# Patient Record
Sex: FEMALE | Race: WHITE | NOT HISPANIC OR LATINO | Employment: FULL TIME | URBAN - METROPOLITAN AREA
[De-identification: names, ages, dates, MRNs, and addresses within clinical notes are randomized per-mention and may not be internally consistent; named-entity substitution may affect disease eponyms.]

---

## 2019-12-11 ENCOUNTER — OFFICE VISIT (OUTPATIENT)
Dept: URGENT CARE | Facility: CLINIC | Age: 29
End: 2019-12-11
Payer: COMMERCIAL

## 2019-12-11 VITALS
TEMPERATURE: 99 F | OXYGEN SATURATION: 99 % | DIASTOLIC BLOOD PRESSURE: 69 MMHG | WEIGHT: 145 LBS | SYSTOLIC BLOOD PRESSURE: 110 MMHG | HEART RATE: 83 BPM

## 2019-12-11 DIAGNOSIS — J02.9 VIRAL PHARYNGITIS: Primary | ICD-10-CM

## 2019-12-11 LAB
CTP QC/QA: YES
S PYO RRNA THROAT QL PROBE: NEGATIVE

## 2019-12-11 PROCEDURE — 99203 OFFICE O/P NEW LOW 30 MIN: CPT | Mod: 25,S$GLB,, | Performed by: NURSE PRACTITIONER

## 2019-12-11 PROCEDURE — 96372 PR INJECTION,THERAP/PROPH/DIAG2ST, IM OR SUBCUT: ICD-10-PCS | Mod: S$GLB,,, | Performed by: NURSE PRACTITIONER

## 2019-12-11 PROCEDURE — 99203 PR OFFICE/OUTPT VISIT, NEW, LEVL III, 30-44 MIN: ICD-10-PCS | Mod: 25,S$GLB,, | Performed by: NURSE PRACTITIONER

## 2019-12-11 PROCEDURE — 96372 THER/PROPH/DIAG INJ SC/IM: CPT | Mod: S$GLB,,, | Performed by: NURSE PRACTITIONER

## 2019-12-11 PROCEDURE — 87880 STREP A ASSAY W/OPTIC: CPT | Mod: QW,S$GLB,, | Performed by: NURSE PRACTITIONER

## 2019-12-11 PROCEDURE — 87880 POCT RAPID STREP A: ICD-10-PCS | Mod: QW,S$GLB,, | Performed by: NURSE PRACTITIONER

## 2019-12-11 RX ORDER — BETAMETHASONE SODIUM PHOSPHATE AND BETAMETHASONE ACETATE 3; 3 MG/ML; MG/ML
9 INJECTION, SUSPENSION INTRA-ARTICULAR; INTRALESIONAL; INTRAMUSCULAR; SOFT TISSUE
Status: COMPLETED | OUTPATIENT
Start: 2019-12-11 | End: 2019-12-11

## 2019-12-11 RX ADMIN — BETAMETHASONE SODIUM PHOSPHATE AND BETAMETHASONE ACETATE 9 MG: 3; 3 INJECTION, SUSPENSION INTRA-ARTICULAR; INTRALESIONAL; INTRAMUSCULAR; SOFT TISSUE at 12:12

## 2019-12-11 NOTE — PROGRESS NOTES
Subjective:       Patient ID: Renetta Deshpande is a 29 y.o. female.    Vitals:  weight is 65.8 kg (145 lb). Her temperature is 98.5 °F (36.9 °C). Her blood pressure is 110/69 and her pulse is 83. Her oxygen saturation is 99%.     Chief Complaint: Sore Throat    Sore Throat    This is a new problem. The current episode started in the past 7 days (3 days). The problem has been gradually worsening. Sore throat worse side: both side. The pain is at a severity of 4/10. Associated symptoms include a hoarse voice, swollen glands and trouble swallowing. Pertinent negatives include no congestion, coughing, diarrhea, headaches, shortness of breath or vomiting. Treatments tried: elderberry/Zicam. The treatment provided mild relief.       Constitution: Negative for chills, fatigue and fever.   HENT: Positive for sore throat, trouble swallowing and voice change. Negative for congestion.    Neck: Negative for painful lymph nodes.   Cardiovascular: Negative for chest pain and leg swelling.   Eyes: Negative for double vision and blurred vision.   Respiratory: Negative for cough and shortness of breath.    Gastrointestinal: Negative for nausea, vomiting and diarrhea.   Genitourinary: Negative for dysuria, frequency, urgency and history of kidney stones.   Musculoskeletal: Negative for joint pain, joint swelling, muscle cramps and muscle ache.   Skin: Negative for color change, pale, rash and bruising.   Allergic/Immunologic: Negative for seasonal allergies.   Neurological: Negative for dizziness, history of vertigo, light-headedness, passing out and headaches.   Hematologic/Lymphatic: Negative for swollen lymph nodes.   Psychiatric/Behavioral: Negative for nervous/anxious, sleep disturbance and depression. The patient is not nervous/anxious.        Objective:      Physical Exam   Constitutional: She is oriented to person, place, and time. Vital signs are normal. She appears well-developed and well-nourished. She is active and  cooperative.  Non-toxic appearance. She does not have a sickly appearance. She does not appear ill. No distress.   HENT:   Head: Normocephalic and atraumatic.   Right Ear: Hearing, tympanic membrane, abnromal external ear and ear canal normal.   Left Ear: Hearing, tympanic membrane, abnormal external ear and ear canal normal.   Nose: Nose abnormal. No mucosal edema, rhinorrhea or nasal deformity. No epistaxis. Right sinus exhibits no maxillary sinus tenderness and no frontal sinus tenderness. Left sinus exhibits no maxillary sinus tenderness and no frontal sinus tenderness.   Mouth/Throat: Uvula is midline and mucous membranes are normal. No trismus in the jaw. Normal dentition. No uvula swelling. Posterior oropharyngeal erythema present. No oropharyngeal exudate or posterior oropharyngeal edema. Tonsils are 1+ on the right. Tonsils are 1+ on the left. No tonsillar exudate.   Eyes: Pupils are equal, round, and reactive to light. Conjunctivae, EOM and lids are normal. No scleral icterus.   Neck: Trachea normal, full passive range of motion without pain and phonation normal. Neck supple. No neck rigidity. No edema and no erythema present.   Cardiovascular: Normal rate, regular rhythm, normal heart sounds, intact distal pulses and normal pulses.   Pulses:       Radial pulses are 2+ on the right side, and 2+ on the left side.   Pulmonary/Chest: Effort normal and breath sounds normal. No respiratory distress. She has no decreased breath sounds. She has no rhonchi.   Musculoskeletal: Normal range of motion. She exhibits no edema or deformity.   Neurological: She is alert and oriented to person, place, and time. She exhibits normal muscle tone. Coordination and gait normal.   Skin: Skin is warm, dry, intact, not diaphoretic and not pale. Capillary refill takes less than 2 seconds.   Psychiatric: She has a normal mood and affect. Her speech is normal and behavior is normal. Judgment and thought content normal. Cognition and  memory are normal.   Nursing note and vitals reviewed.        Assessment:       1. Viral pharyngitis        Plan:         Viral pharyngitis  -     POCT rapid strep A  -     betamethasone acetate-betamethasone sodium phosphate injection 9 mg      Patient Instructions   You received a steroid shot today - this can elevate your blood pressure, elevate your blood sugar, water weight gain, nervous energy, redness to the face and dimpling of the skin where the shot goes in.       Viral Pharyngitis (Sore Throat)    You (or your child, if your child is the patient) have pharyngitis (sore throat). This infection is caused by a virus. It can cause throat pain that is worse when swallowing, aching all over, headache, and fever. The infection may be spread by coughing, kissing, or touching others after touching your mouth or nose. Antibiotic medications do not work against viruses, so they are not used for treating this condition.  Home care  · If your symptoms are severe, rest at home. Return to work or school when you feel well enough.   · Drink plenty of fluids to avoid dehydration.  · For children: Use acetaminophen for fever, fussiness or discomfort. In infants over six months of age, you may use ibuprofen instead of acetaminophen. (NOTE: If your child has chronic liver or kidney disease or ever had a stomach ulcer or GI bleeding, talk with your doctor before using these medicines.) (NOTE: Aspirin should never be used in anyone under 18 years of age who is ill with a fever. It may cause severe liver damage.)   · For adults: You may use acetaminophen or ibuprofen to control pain or fever, unless another medicine was prescribed for this. (NOTE: If you have chronic liver or kidney disease or ever had a stomach ulcer or GI bleeding, talk with your doctor before using these medicines.)  · Throat lozenges or numbing throat sprays can help reduce pain. Gargling with warm salt water will also help reduce throat pain. For this,  dissolve 1/2 teaspoon of salt in 1 glass of warm water. To help soothe a sore throat, children can sip on juice or a popsicle. Children 5 years and older can also suck on a lollipop or hard candy.  · Avoid salty or spicy foods, which can be irritating to the throat.  Follow-up care  Follow up with your healthcare provider or our staff if you are not improving over the next week.  When to seek medical advice  Call your healthcare provider right away if any of these occur:  · Fever as directed by your doctor.  For children, seek care if:  ¨ Your child is of any age and has repeated fevers above 104°F (40°C).  ¨ Your child is younger than 2 years of age and has a fever of 100.4°F (38°C) that continues for more than 1 day.  ¨ Your child is 2 years old or older and has a fever of 100.4°F (38°C) that continues for more than 3 days.  · New or worsening ear pain, sinus pain, or headache  · Painful lumps in the back of neck  · Stiff neck  · Lymph nodes are getting larger  · Inability to swallow liquids, excessive drooling, or inability to open mouth wide due to throat pain  · Signs of dehydration (very dark urine or no urine, sunken eyes, dizziness)  · Trouble breathing or noisy breathing  · Muffled voice  · New rash  · Child appears to be getting sicker  Date Last Reviewed: 4/13/2015  © 1464-9180 [employer]. [address] [place] [address], [place] [address]. All rights reserved. This information is not intended as a substitute for professional medical care. Always follow your healthcare professional's instructions.

## 2019-12-11 NOTE — PATIENT INSTRUCTIONS
You received a steroid shot today - this can elevate your blood pressure, elevate your blood sugar, water weight gain, nervous energy, redness to the face and dimpling of the skin where the shot goes in.       Viral Pharyngitis (Sore Throat)    You (or your child, if your child is the patient) have pharyngitis (sore throat). This infection is caused by a virus. It can cause throat pain that is worse when swallowing, aching all over, headache, and fever. The infection may be spread by coughing, kissing, or touching others after touching your mouth or nose. Antibiotic medications do not work against viruses, so they are not used for treating this condition.  Home care  · If your symptoms are severe, rest at home. Return to work or school when you feel well enough.   · Drink plenty of fluids to avoid dehydration.  · For children: Use acetaminophen for fever, fussiness or discomfort. In infants over six months of age, you may use ibuprofen instead of acetaminophen. (NOTE: If your child has chronic liver or kidney disease or ever had a stomach ulcer or GI bleeding, talk with your doctor before using these medicines.) (NOTE: Aspirin should never be used in anyone under 18 years of age who is ill with a fever. It may cause severe liver damage.)   · For adults: You may use acetaminophen or ibuprofen to control pain or fever, unless another medicine was prescribed for this. (NOTE: If you have chronic liver or kidney disease or ever had a stomach ulcer or GI bleeding, talk with your doctor before using these medicines.)  · Throat lozenges or numbing throat sprays can help reduce pain. Gargling with warm salt water will also help reduce throat pain. For this, dissolve 1/2 teaspoon of salt in 1 glass of warm water. To help soothe a sore throat, children can sip on juice or a popsicle. Children 5 years and older can also suck on a lollipop or hard candy.  · Avoid salty or spicy foods, which can be irritating to the  throat.  Follow-up care  Follow up with your healthcare provider or our staff if you are not improving over the next week.  When to seek medical advice  Call your healthcare provider right away if any of these occur:  · Fever as directed by your doctor.  For children, seek care if:  ¨ Your child is of any age and has repeated fevers above 104°F (40°C).  ¨ Your child is younger than 2 years of age and has a fever of 100.4°F (38°C) that continues for more than 1 day.  ¨ Your child is 2 years old or older and has a fever of 100.4°F (38°C) that continues for more than 3 days.  · New or worsening ear pain, sinus pain, or headache  · Painful lumps in the back of neck  · Stiff neck  · Lymph nodes are getting larger  · Inability to swallow liquids, excessive drooling, or inability to open mouth wide due to throat pain  · Signs of dehydration (very dark urine or no urine, sunken eyes, dizziness)  · Trouble breathing or noisy breathing  · Muffled voice  · New rash  · Child appears to be getting sicker  Date Last Reviewed: 4/13/2015  © 2776-8080 [employer]. [address] [place] [address], [place] [address]. All rights reserved. This information is not intended as a substitute for professional medical care. Always follow your healthcare professional's instructions.

## 2022-04-05 ENCOUNTER — OFFICE VISIT (OUTPATIENT)
Dept: INTERNAL MEDICINE | Facility: CLINIC | Age: 32
End: 2022-04-05
Payer: COMMERCIAL

## 2022-04-05 VITALS
OXYGEN SATURATION: 99 % | SYSTOLIC BLOOD PRESSURE: 126 MMHG | BODY MASS INDEX: 26.45 KG/M2 | DIASTOLIC BLOOD PRESSURE: 62 MMHG | HEIGHT: 65 IN | TEMPERATURE: 98 F | WEIGHT: 158.75 LBS | HEART RATE: 77 BPM

## 2022-04-05 DIAGNOSIS — J45.990 EXERCISE-INDUCED ASTHMA: ICD-10-CM

## 2022-04-05 DIAGNOSIS — R06.09 DYSPNEA ON EXERTION: ICD-10-CM

## 2022-04-05 DIAGNOSIS — Z00.00 WELLNESS EXAMINATION: Primary | ICD-10-CM

## 2022-04-05 DIAGNOSIS — R00.0 TACHYCARDIA: ICD-10-CM

## 2022-04-05 DIAGNOSIS — Z11.59 ENCOUNTER FOR HEPATITIS C SCREENING TEST FOR LOW RISK PATIENT: ICD-10-CM

## 2022-04-05 DIAGNOSIS — R00.2 PALPITATIONS: ICD-10-CM

## 2022-04-05 PROCEDURE — 99385 PR PREVENTIVE VISIT,NEW,18-39: ICD-10-PCS | Mod: S$GLB,,, | Performed by: INTERNAL MEDICINE

## 2022-04-05 PROCEDURE — 3078F PR MOST RECENT DIASTOLIC BLOOD PRESSURE < 80 MM HG: ICD-10-PCS | Mod: CPTII,S$GLB,, | Performed by: INTERNAL MEDICINE

## 2022-04-05 PROCEDURE — 3078F DIAST BP <80 MM HG: CPT | Mod: CPTII,S$GLB,, | Performed by: INTERNAL MEDICINE

## 2022-04-05 PROCEDURE — 3074F PR MOST RECENT SYSTOLIC BLOOD PRESSURE < 130 MM HG: ICD-10-PCS | Mod: CPTII,S$GLB,, | Performed by: INTERNAL MEDICINE

## 2022-04-05 PROCEDURE — 99999 PR PBB SHADOW E&M-EST. PATIENT-LVL III: ICD-10-PCS | Mod: PBBFAC,,, | Performed by: INTERNAL MEDICINE

## 2022-04-05 PROCEDURE — 3008F PR BODY MASS INDEX (BMI) DOCUMENTED: ICD-10-PCS | Mod: CPTII,S$GLB,, | Performed by: INTERNAL MEDICINE

## 2022-04-05 PROCEDURE — 99999 PR PBB SHADOW E&M-EST. PATIENT-LVL III: CPT | Mod: PBBFAC,,, | Performed by: INTERNAL MEDICINE

## 2022-04-05 PROCEDURE — 99385 PREV VISIT NEW AGE 18-39: CPT | Mod: S$GLB,,, | Performed by: INTERNAL MEDICINE

## 2022-04-05 PROCEDURE — 3008F BODY MASS INDEX DOCD: CPT | Mod: CPTII,S$GLB,, | Performed by: INTERNAL MEDICINE

## 2022-04-05 PROCEDURE — 3074F SYST BP LT 130 MM HG: CPT | Mod: CPTII,S$GLB,, | Performed by: INTERNAL MEDICINE

## 2022-04-05 RX ORDER — NORETHINDRONE ACETATE AND ETHINYL ESTRADIOL 1MG-20(21)
1 KIT ORAL DAILY
COMMUNITY
Start: 2022-03-16

## 2022-04-05 NOTE — PROGRESS NOTES
[facility] Note    Chief Complaint      Chief Complaint   Patient presents with    Establish Care     Discuss heart rate during work outs    Annual Exam     History of Present Illness      Renetta Deshpande is a 31 y.o. female who presents today for chief complaint establish care and . Patient is new to me.    PCP: Samantha Dow MD  Patient comes to appointment alone.     HPI   Used to see Dr Hyatt, last seen 2.2019  Has concerns of elevated heart rate when shes exercising, says can get up to 190, has a fam hx of heart disease she is not sure who has heart disease or what the diagnosis is. 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  Mom and dad alive, garland snot know dad's medical history    Works as MA in [employer]. Mood is ok, having some new onset sleep difficulty, exercise as above, watches diet       Active Problem List with Overview Notes    Diagnosis Date Noted    Dyspnea on exertion 04/05/2022    Palpitations 04/05/2022    Tachycardia 04/05/2022       Health Maintenance   Topic Date Due    Hepatitis C Screening  Never done    Lipid Panel  Never done    TETANUS VACCINE  05/01/2024       History reviewed. No pertinent past medical history.    Past Surgical History:   Procedure Laterality Date    TONSILLECTOMY         family history includes No Known Problems in her father and mother.    Social History     Tobacco Use    Smoking status: Never Smoker    Smokeless  "tobacco: Never Used   Substance Use Topics    Alcohol use: Not Currently    Drug use: Never       Review of Systems   Constitutional: Negative for chills, fever, malaise/fatigue and weight loss.   Respiratory: Positive for shortness of breath and wheezing. Negative for cough and sputum production.    Cardiovascular: Positive for palpitations. Negative for chest pain, orthopnea, claudication, leg swelling and PND.   Gastrointestinal: Negative for constipation, diarrhea, nausea and vomiting.   Genitourinary: Negative for dysuria, frequency, hematuria and urgency.        Outpatient Encounter Medications as of 4/5/2022   Medication Sig Dispense Refill    BLISOVI FE 1/20, 28, 1 mg-20 mcg (21)/75 mg (7) per tablet Take 1 tablet by mouth once daily.       No facility-administered encounter medications on file as of 4/5/2022.        Review of patient's allergies indicates:  No Known Allergies        Physical Exam      Vital Signs  Temp: 98.3 °F (36.8 °C)  Temp src: Oral  Pulse: 77  SpO2: 99 %  BP: 126/62  BP Location: Right arm  Patient Position: Sitting  Pain Score: 0-No pain  Height and Weight  Height: 5' 5" (165.1 cm)  Weight: 72 kg (158 lb 11.7 oz)  BSA (Calculated - sq m): 1.82 sq meters  BMI (Calculated): 26.4  Weight in (lb) to have BMI = 25: 149.9]    Physical Exam  Vitals reviewed.   Constitutional:       General: She is not in acute distress.     Appearance: She is well-developed. She is not diaphoretic.   HENT:      Head: Normocephalic and atraumatic.      Right Ear: External ear normal.      Left Ear: External ear normal.      Nose: Nose normal.   Eyes:      General:         Right eye: No discharge.         Left eye: No discharge.      Conjunctiva/sclera: Conjunctivae normal.   Neck:      Thyroid: No thyromegaly.   Cardiovascular:      Rate and Rhythm: Normal rate and regular rhythm.      Heart sounds: Normal heart sounds. No murmur heard.  Pulmonary:      Effort: Pulmonary effort is normal. No respiratory " distress.      Breath sounds: Normal breath sounds.   Abdominal:      General: Bowel sounds are normal. There is no distension.      Palpations: Abdomen is soft.      Tenderness: There is no abdominal tenderness.   Musculoskeletal:         General: No deformity. Normal range of motion.      Cervical back: Normal range of motion.   Skin:     General: Skin is warm and dry.      Coloration: Skin is not pale.      Findings: No rash.   Neurological:      Mental Status: She is alert and oriented to person, place, and time.   Psychiatric:         Behavior: Behavior normal.         Thought Content: Thought content normal.         Judgment: Judgment normal.            Laboratory & Radiology:  No recent for review       Assessment/Plan     Renetta Deshpande is a 31 y.o.female with:    Tachycardia  Holter    Palpitations  Holter, cmp mg, tsh     Dyspnea on exertion  Exercise stress, ref to cardiology  Discussed age related target and max heart rate     Get Gyn records   Annual labs     Orders Placed This Encounter   Procedures    Lipid Panel     Standing Status:   Future     Standing Expiration Date:   6/4/2023    Comprehensive Metabolic Panel     Standing Status:   Future     Standing Expiration Date:   6/4/2023    T4, Free     Standing Status:   Future     Standing Expiration Date:   6/5/2023    TSH     Standing Status:   Future     Standing Expiration Date:   6/5/2023    Magnesium     Standing Status:   Future     Standing Expiration Date:   6/4/2023    CBC Without Differential     Standing Status:   Future     Standing Expiration Date:   6/4/2023    Hepatitis C Antibody     Standing Status:   Future     Standing Expiration Date:   6/4/2023     Order Specific Question:   Release to patient     Answer:   Immediate    Holter monitor - 48 hour     Standing Status:   Future     Standing Expiration Date:   4/5/2023     Order Specific Question:   Release to patient     Answer:   Immediate    Exercise Stress - EKG      Standing Status:   Future     Standing Expiration Date:   4/5/2023     Order Specific Question:   Release to patient     Answer:   Immediate       Use of the [employer] Patient Portal discussed and encouraged during today's visit  -Continue current medications and maintain follow up with specialists.  Return to clinic in 2 months.  No future appointments.    Samantha Dow MD  4/5/2022 8:11 AM    Primary Care Internal Medicine

## 2022-04-05 NOTE — LETTER
AUTHORIZATION FOR RELEASE OF   CONFIDENTIAL INFORMATION    Dear Dr. Ramírez,    We are seeing Renetta Deshpande, date of birth 1990, in the clinic at [facility]. Samantha Dow MD is the patient's PCP. Renetta Deshpande has an outstanding lab/procedure at the time we reviewed her chart. In order to help keep her health information updated, she has authorized us to request the following medical record(s):        (  )  MAMMOGRAM                                      (  )  COLONOSCOPY      ( X )  PAP SMEAR                                          (  )  OUTSIDE LAB RESULTS     (  )  DEXA SCAN                                          (  )  EYE EXAM            (  )  FOOT EXAM                                          (  )  ENTIRE RECORD     (  )  OUTSIDE IMMUNIZATIONS                 (  )  _______________         Please fax records to [facility], Abby E. Gandolfi, MD, [phone]     If you have any questions, please contact Renetta at [phone].           Patient Name: Renetta Deshpande  : 1990  Patient Phone #: [phone]

## 2022-04-08 ENCOUNTER — LAB VISIT (OUTPATIENT)
Dept: LAB | Facility: HOSPITAL | Age: 32
End: 2022-04-08
Attending: INTERNAL MEDICINE
Payer: COMMERCIAL

## 2022-04-08 DIAGNOSIS — R00.2 PALPITATIONS: ICD-10-CM

## 2022-04-08 DIAGNOSIS — Z11.59 ENCOUNTER FOR HEPATITIS C SCREENING TEST FOR LOW RISK PATIENT: ICD-10-CM

## 2022-04-08 DIAGNOSIS — Z00.00 WELLNESS EXAMINATION: ICD-10-CM

## 2022-04-08 LAB
ALBUMIN SERPL BCP-MCNC: 3.8 G/DL (ref 3.5–5.2)
ALP SERPL-CCNC: 43 U/L (ref 55–135)
ALT SERPL W/O P-5'-P-CCNC: 13 U/L (ref 10–44)
ANION GAP SERPL CALC-SCNC: 10 MMOL/L (ref 8–16)
AST SERPL-CCNC: 17 U/L (ref 10–40)
BILIRUB SERPL-MCNC: 0.5 MG/DL (ref 0.1–1)
BUN SERPL-MCNC: 17 MG/DL (ref 6–20)
CALCIUM SERPL-MCNC: 9.2 MG/DL (ref 8.7–10.5)
CHLORIDE SERPL-SCNC: 105 MMOL/L (ref 95–110)
CHOLEST SERPL-MCNC: 156 MG/DL (ref 120–199)
CHOLEST/HDLC SERPL: 2.3 {RATIO} (ref 2–5)
CO2 SERPL-SCNC: 25 MMOL/L (ref 23–29)
CREAT SERPL-MCNC: 0.8 MG/DL (ref 0.5–1.4)
ERYTHROCYTE [DISTWIDTH] IN BLOOD BY AUTOMATED COUNT: 12.2 % (ref 11.5–14.5)
EST. GFR  (AFRICAN AMERICAN): >60 ML/MIN/1.73 M^2
EST. GFR  (NON AFRICAN AMERICAN): >60 ML/MIN/1.73 M^2
GLUCOSE SERPL-MCNC: 88 MG/DL (ref 70–110)
HCT VFR BLD AUTO: 41.6 % (ref 37–48.5)
HDLC SERPL-MCNC: 68 MG/DL (ref 40–75)
HDLC SERPL: 43.6 % (ref 20–50)
HGB BLD-MCNC: 13.3 G/DL (ref 12–16)
LDLC SERPL CALC-MCNC: 76.4 MG/DL (ref 63–159)
MAGNESIUM SERPL-MCNC: 2 MG/DL (ref 1.6–2.6)
MCH RBC QN AUTO: 31.4 PG (ref 27–31)
MCHC RBC AUTO-ENTMCNC: 32 G/DL (ref 32–36)
MCV RBC AUTO: 98 FL (ref 82–98)
NONHDLC SERPL-MCNC: 88 MG/DL
PLATELET # BLD AUTO: 209 K/UL (ref 150–450)
PMV BLD AUTO: 11.4 FL (ref 9.2–12.9)
POTASSIUM SERPL-SCNC: 4 MMOL/L (ref 3.5–5.1)
PROT SERPL-MCNC: 7.1 G/DL (ref 6–8.4)
RBC # BLD AUTO: 4.23 M/UL (ref 4–5.4)
SODIUM SERPL-SCNC: 140 MMOL/L (ref 136–145)
T4 FREE SERPL-MCNC: 0.89 NG/DL (ref 0.71–1.51)
TRIGL SERPL-MCNC: 58 MG/DL (ref 30–150)
TSH SERPL DL<=0.005 MIU/L-ACNC: 1.5 UIU/ML (ref 0.4–4)
WBC # BLD AUTO: 6.63 K/UL (ref 3.9–12.7)

## 2022-04-08 PROCEDURE — 84439 ASSAY OF FREE THYROXINE: CPT | Performed by: INTERNAL MEDICINE

## 2022-04-08 PROCEDURE — 80053 COMPREHEN METABOLIC PANEL: CPT | Performed by: INTERNAL MEDICINE

## 2022-04-08 PROCEDURE — 36415 COLL VENOUS BLD VENIPUNCTURE: CPT | Performed by: INTERNAL MEDICINE

## 2022-04-08 PROCEDURE — 84443 ASSAY THYROID STIM HORMONE: CPT | Performed by: INTERNAL MEDICINE

## 2022-04-08 PROCEDURE — 83735 ASSAY OF MAGNESIUM: CPT | Performed by: INTERNAL MEDICINE

## 2022-04-08 PROCEDURE — 86803 HEPATITIS C AB TEST: CPT | Performed by: INTERNAL MEDICINE

## 2022-04-08 PROCEDURE — 85027 COMPLETE CBC AUTOMATED: CPT | Performed by: INTERNAL MEDICINE

## 2022-04-08 PROCEDURE — 80061 LIPID PANEL: CPT | Performed by: INTERNAL MEDICINE

## 2022-04-11 ENCOUNTER — PATIENT MESSAGE (OUTPATIENT)
Dept: INTERNAL MEDICINE | Facility: CLINIC | Age: 32
End: 2022-04-11
Payer: COMMERCIAL

## 2022-04-12 LAB — HCV AB SERPL QL IA: NEGATIVE

## 2022-04-18 ENCOUNTER — CLINICAL SUPPORT (OUTPATIENT)
Dept: CARDIOLOGY | Facility: HOSPITAL | Age: 32
End: 2022-04-18
Attending: INTERNAL MEDICINE
Payer: COMMERCIAL

## 2022-04-18 ENCOUNTER — HOSPITAL ENCOUNTER (OUTPATIENT)
Dept: CARDIOLOGY | Facility: HOSPITAL | Age: 32
Discharge: HOME | End: 2022-04-18
Attending: INTERNAL MEDICINE
Payer: COMMERCIAL

## 2022-04-18 VITALS — WEIGHT: 158 LBS | BODY MASS INDEX: 26.33 KG/M2 | HEIGHT: 65 IN

## 2022-04-18 DIAGNOSIS — R00.2 PALPITATIONS: ICD-10-CM

## 2022-04-18 DIAGNOSIS — R06.09 DYSPNEA ON EXERTION: ICD-10-CM

## 2022-04-18 DIAGNOSIS — R00.0 TACHYCARDIA: ICD-10-CM

## 2022-04-18 LAB
CV STRESS BASE HR: 57 BPM
DIASTOLIC BLOOD PRESSURE: 55 MMHG
OHS CV CPX 1 MINUTE RECOVERY HEART RATE: 139 BPM
OHS CV CPX 85 PERCENT MAX PREDICTED HEART RATE MALE: 150
OHS CV CPX ESTIMATED METS: 15
OHS CV CPX MAX PREDICTED HEART RATE: 177
OHS CV CPX PATIENT IS FEMALE: 1
OHS CV CPX PATIENT IS MALE: 0
OHS CV CPX PEAK DIASTOLIC BLOOD PRESSURE: 78 MMHG
OHS CV CPX PEAK HEAR RATE: 176 BPM
OHS CV CPX PEAK RATE PRESSURE PRODUCT: NORMAL
OHS CV CPX PEAK SYSTOLIC BLOOD PRESSURE: 198 MMHG
OHS CV CPX PERCENT MAX PREDICTED HEART RATE ACHIEVED: 99
OHS CV CPX RATE PRESSURE PRODUCT PRESENTING: 6099
STRESS ECHO POST EXERCISE DUR MIN: 9 MINUTES
STRESS ECHO POST EXERCISE DUR SEC: 1 SECONDS
SYSTOLIC BLOOD PRESSURE: 107 MMHG

## 2022-04-18 PROCEDURE — 93018 CV STRESS TEST I&R ONLY: CPT | Mod: ,,, | Performed by: INTERNAL MEDICINE

## 2022-04-18 PROCEDURE — 93225 XTRNL ECG REC<48 HRS REC: CPT

## 2022-04-18 PROCEDURE — 93017 CV STRESS TEST TRACING ONLY: CPT

## 2022-04-18 PROCEDURE — 93016 CV STRESS TEST SUPVJ ONLY: CPT | Mod: ,,, | Performed by: INTERNAL MEDICINE

## 2022-04-18 PROCEDURE — 93227 XTRNL ECG REC<48 HR R&I: CPT | Mod: ,,, | Performed by: INTERNAL MEDICINE

## 2022-04-18 PROCEDURE — 93016 EXERCISE STRESS - EKG (CUPID ONLY): ICD-10-PCS | Mod: ,,, | Performed by: INTERNAL MEDICINE

## 2022-04-18 PROCEDURE — 93227 HOLTER MONITOR - 48 HOUR (CUPID ONLY): ICD-10-PCS | Mod: ,,, | Performed by: INTERNAL MEDICINE

## 2022-04-18 PROCEDURE — 93018 EXERCISE STRESS - EKG (CUPID ONLY): ICD-10-PCS | Mod: ,,, | Performed by: INTERNAL MEDICINE

## 2022-04-22 LAB
OHS CV EVENT MONITOR DAY: 0
OHS CV HOLTER LENGTH DECIMAL HOURS: 48
OHS CV HOLTER LENGTH HOURS: 48
OHS CV HOLTER LENGTH MINUTES: 0
OHS CV HOLTER SINUS AVERAGE HR: 74
OHS CV HOLTER SINUS MAX HR: 185
OHS CV HOLTER SINUS MIN HR: 45

## 2022-05-30 ENCOUNTER — PATIENT MESSAGE (OUTPATIENT)
Dept: ADMINISTRATIVE | Facility: HOSPITAL | Age: 32
End: 2022-05-30
Payer: COMMERCIAL

## 2022-06-07 ENCOUNTER — PATIENT MESSAGE (OUTPATIENT)
Dept: INTERNAL MEDICINE | Facility: CLINIC | Age: 32
End: 2022-06-07
Payer: COMMERCIAL

## 2022-06-08 ENCOUNTER — PATIENT MESSAGE (OUTPATIENT)
Dept: INTERNAL MEDICINE | Facility: CLINIC | Age: 32
End: 2022-06-08
Payer: COMMERCIAL

## 2022-06-17 DIAGNOSIS — R05.9 COUGH: Primary | ICD-10-CM

## 2022-06-17 LAB
CTP QC/QA: YES
SARS-COV-2 AG RESP QL IA.RAPID: POSITIVE

## 2022-06-20 ENCOUNTER — PATIENT MESSAGE (OUTPATIENT)
Dept: INFECTIOUS DISEASES | Facility: HOSPITAL | Age: 32
End: 2022-06-20
Payer: COMMERCIAL

## 2022-06-28 ENCOUNTER — OFFICE VISIT (OUTPATIENT)
Dept: INTERNAL MEDICINE | Facility: CLINIC | Age: 32
End: 2022-06-28
Payer: COMMERCIAL

## 2022-06-28 VITALS
OXYGEN SATURATION: 99 % | HEIGHT: 65 IN | SYSTOLIC BLOOD PRESSURE: 114 MMHG | TEMPERATURE: 99 F | HEART RATE: 81 BPM | DIASTOLIC BLOOD PRESSURE: 64 MMHG | WEIGHT: 162.94 LBS | BODY MASS INDEX: 27.15 KG/M2

## 2022-06-28 DIAGNOSIS — R55 SYNCOPE, UNSPECIFIED SYNCOPE TYPE: Primary | ICD-10-CM

## 2022-06-28 PROCEDURE — 1159F MED LIST DOCD IN RCRD: CPT | Mod: CPTII,S$GLB,, | Performed by: INTERNAL MEDICINE

## 2022-06-28 PROCEDURE — 3008F BODY MASS INDEX DOCD: CPT | Mod: CPTII,S$GLB,, | Performed by: INTERNAL MEDICINE

## 2022-06-28 PROCEDURE — 3074F SYST BP LT 130 MM HG: CPT | Mod: CPTII,S$GLB,, | Performed by: INTERNAL MEDICINE

## 2022-06-28 PROCEDURE — 3074F PR MOST RECENT SYSTOLIC BLOOD PRESSURE < 130 MM HG: ICD-10-PCS | Mod: CPTII,S$GLB,, | Performed by: INTERNAL MEDICINE

## 2022-06-28 PROCEDURE — 3078F PR MOST RECENT DIASTOLIC BLOOD PRESSURE < 80 MM HG: ICD-10-PCS | Mod: CPTII,S$GLB,, | Performed by: INTERNAL MEDICINE

## 2022-06-28 PROCEDURE — 99999 PR PBB SHADOW E&M-EST. PATIENT-LVL III: ICD-10-PCS | Mod: PBBFAC,,, | Performed by: INTERNAL MEDICINE

## 2022-06-28 PROCEDURE — 3078F DIAST BP <80 MM HG: CPT | Mod: CPTII,S$GLB,, | Performed by: INTERNAL MEDICINE

## 2022-06-28 PROCEDURE — 99214 PR OFFICE/OUTPT VISIT, EST, LEVL IV, 30-39 MIN: ICD-10-PCS | Mod: S$GLB,,, | Performed by: INTERNAL MEDICINE

## 2022-06-28 PROCEDURE — 99999 PR PBB SHADOW E&M-EST. PATIENT-LVL III: CPT | Mod: PBBFAC,,, | Performed by: INTERNAL MEDICINE

## 2022-06-28 PROCEDURE — 3008F PR BODY MASS INDEX (BMI) DOCUMENTED: ICD-10-PCS | Mod: CPTII,S$GLB,, | Performed by: INTERNAL MEDICINE

## 2022-06-28 PROCEDURE — 99214 OFFICE O/P EST MOD 30 MIN: CPT | Mod: S$GLB,,, | Performed by: INTERNAL MEDICINE

## 2022-06-28 PROCEDURE — 1159F PR MEDICATION LIST DOCUMENTED IN MEDICAL RECORD: ICD-10-PCS | Mod: CPTII,S$GLB,, | Performed by: INTERNAL MEDICINE

## 2022-06-28 NOTE — PROGRESS NOTES
[facility] Note    Chief Complaint      Chief Complaint   Patient presents with    Follow-up     No real change or improvement, still pending for stress test      History of Present Illness      Renetta Deshpande is a 31 y.o. female who presents today for chief complaint follow up tachycardia .     HPI   Seen in Aprl for annual   Had negative holter and stress test, still feeling like she has tachycardia she feel is unexplained when she exercises, having to work harder when she exercises   Hr max 198, no clear precipitating factor as to what drives her HR up, she juist feels her HR is high both at rest and with exertion for her level of activity   Feels like she takes an unusually amount of time to recover, no change in her endurance or personal recovery   Having presyncope, gets light headed, tunnel vision, she is able to overcome this, always on rest never at exertion,has had syncope in the past   Active Problem List with Overview Notes    Diagnosis Date Noted    Dyspnea on exertion 04/05/2022    Palpitations 04/05/2022    Tachycardia 04/05/2022       Health Maintenance   Topic Date Due    TETANUS VACCINE  05/01/2024    Hepatitis C Screening  Completed    Lipid Panel  Completed       History reviewed. No pertinent past medical history.    Past Surgical History:   Procedure Laterality Date    TONSILLECTOMY         family history includes No Known Problems in her father and mother.    Social History     Tobacco Use    Smoking status: Never Smoker    Smokeless tobacco: Never Used   Substance Use Topics    Alcohol use: Not Currently    Drug use: Never       Review of Systems   Constitutional: Negative for chills, fever, malaise/fatigue and weight loss.   Respiratory: Negative for cough, sputum production, shortness of breath and wheezing.    Cardiovascular: Positive for palpitations. Negative for chest pain, orthopnea and leg swelling.   Gastrointestinal: Negative for  "constipation, diarrhea, nausea and vomiting.   Genitourinary: Negative for dysuria, frequency, hematuria and urgency.        Outpatient Encounter Medications as of 6/28/2022   Medication Sig Dispense Refill    BLISOVI FE 1/20, 28, 1 mg-20 mcg (21)/75 mg (7) per tablet Take 1 tablet by mouth once daily.       No facility-administered encounter medications on file as of 6/28/2022.        Review of patient's allergies indicates:  No Known Allergies        Physical Exam      Vital Signs  Temp: 98.5 °F (36.9 °C)  Temp src: Oral  Pulse: 81  SpO2: 99 %  BP: 114/64  BP Location: Left arm  Patient Position: Sitting  Pain Score: 0-No pain  Height and Weight  Height: 5' 5" (165.1 cm)  Weight: 73.9 kg (162 lb 14.7 oz)  BSA (Calculated - sq m): 1.84 sq meters  BMI (Calculated): 27.1  Weight in (lb) to have BMI = 25: 149.9]    Physical Exam  Vitals reviewed.   Constitutional:       General: She is not in acute distress.     Appearance: She is well-developed. She is not diaphoretic.   HENT:      Head: Normocephalic and atraumatic.      Right Ear: External ear normal.      Left Ear: External ear normal.      Nose: Nose normal.   Eyes:      General:         Right eye: No discharge.         Left eye: No discharge.      Conjunctiva/sclera: Conjunctivae normal.   Cardiovascular:      Rate and Rhythm: Normal rate and regular rhythm.      Heart sounds: Normal heart sounds.   Pulmonary:      Effort: Pulmonary effort is normal. No respiratory distress.   Musculoskeletal:         General: Normal range of motion.      Cervical back: Normal range of motion.   Skin:     Coloration: Skin is not pale.      Findings: No rash.   Neurological:      Mental Status: She is alert and oriented to person, place, and time.   Psychiatric:         Behavior: Behavior normal.         Thought Content: Thought content normal.         Judgment: Judgment normal.          Laboratory:  CBC:  Recent Labs   Lab Result Units 04/08/22  0750   WBC K/uL 6.63   RBC M/uL " 4.23   Hemoglobin g/dL 13.3   Hematocrit % 41.6   Platelets K/uL 209   MCV fL 98   MCH pg 31.4*   MCHC g/dL 32.0     CMP:  Recent Labs   Lab Result Units 04/08/22  0750   Glucose mg/dL 88   Calcium mg/dL 9.2   Albumin g/dL 3.8   Total Protein g/dL 7.1   Sodium mmol/L 140   Potassium mmol/L 4.0   CO2 mmol/L 25   Chloride mmol/L 105   BUN mg/dL 17   Alkaline Phosphatase U/L 43*   ALT U/L 13   AST U/L 17   Total Bilirubin mg/dL 0.5     URINALYSIS:  No results for input(s): COLORU, CLARITYU, SPECGRAV, PHUR, PROTEINUA, GLUCOSEU, BILIRUBINCON, BLOODU, WBCU, RBCU, BACTERIA, MUCUS, NITRITE, LEUKOCYTESUR, UROBILINOGEN, HYALINECASTS in the last 2160 hours.   LIPIDS:  Recent Labs   Lab Result Units 04/08/22  0750   TSH uIU/mL 1.504   HDL mg/dL 68   Cholesterol mg/dL 156   Triglycerides mg/dL 58   LDL Cholesterol mg/dL 76.4   HDL/Cholesterol Ratio % 43.6   Non-HDL Cholesterol mg/dL 88   Total Cholesterol/HDL Ratio  2.3     TSH:  Recent Labs   Lab Result Units 04/08/22  0750   TSH uIU/mL 1.504           Assessment/Plan     Renetta Deshpande is a 31 y.o.female with:    1. Syncope, unspecified syncope type  -     Echo  -     Ambulatory referral/consult to Cardiology  -    Lengthy discussion re phsyciology and arrhythmia, ultimately agree that cardiology evaluation is warranted to make definitive plan          Use of the [employer] Patient Portal discussed and encouraged during today's visit  -Continue current medications and maintain follow up with specialists.  Return to clinic in prn .  No future appointments.    Samantha Dow MD  6/28/2022 1:55 PM    Primary Care Internal Medicine

## 2022-06-30 NOTE — PROGRESS NOTES
Cardiology Clinic Note  Reason for Visit: syncope    HPI:     Renetta Deshpande is a 31 y.o. F, who presents for syncope.    She presented to her PCP in 4/2022. Reported elevated heart rate when exercising.   She actively trains. She participates in triathlons. Resting HR 50s.  Has occasional dyspnea/wheezing for recovery period. ?h/o exercise indceud asthma. No PFTs.  Has occasional palps when exercising and HR is 180bpm.   A holter and treadmill were performed and normal. Holter showed sinus tach during palps with HR 150s.    On visit to PCP in 6/2022, she reported ongoing tachycardia when exercising. Feels like she is having to work harder when exercising.  Max HR 198bpm. Feels elevated HR at rest and with exertion. Feels like it takes longer time to recover. No change in endurance.  She also reported presyncope, lightheadedness, tunnel vision. Always at rest, never with exertion. No palpitations/chest pain prior to symptoms.    Awaiting echo.    Medical: none  Surgical: Reviewed, as below.  Family: Reviewed, as below. No premature CAD, HF, SCD.  Social: Reviewed, as below.    ROS:    Pertinent ROS included in HPI and below.  PMH:   No past medical history on file.  Past Surgical History:   Procedure Laterality Date    TONSILLECTOMY       Allergies:   Review of patient's allergies indicates:  No Known Allergies  Medications:     Current Outpatient Medications on File Prior to Visit   Medication Sig Dispense Refill    BLISOVI FE 1/20, 28, 1 mg-20 mcg (21)/75 mg (7) per tablet Take 1 tablet by mouth once daily.       No current facility-administered medications on file prior to visit.     Social History:     Social History     Tobacco Use    Smoking status: Never Smoker    Smokeless tobacco: Never Used   Substance Use Topics    Alcohol use: Not Currently     Family History:     Family History   Problem Relation Age of Onset    No Known Problems Mother     No Known Problems Father      Physical  "Exam:   /70   Ht 5' 5" (1.651 m)   Wt 73 kg (160 lb 15 oz)   BMI 26.78 kg/m²      Constitutional: No apparent distress, conversant  HEENT: Sclera anicteric, extraocular movements intact  Neck: No jugular venous distension, no carotid bruits  CV: Regular rate and rhythm, 2/6 systolic murmur at RUSB, normal S1/S2  Pulm: Clear to auscultation bilaterally  GI: Abdomen soft, no palpable masses  Extremities: No lower extremity edema, warm with palpable pulses  Skin: No ecchymosis, erythema, or ulcers  Psych: Alert and oriented to person place location, appropriate affect  Neuro: No focal deficits    Labs:     Blood Tests:  Lab Results   Component Value Date     04/08/2022    K 4.0 04/08/2022     04/08/2022    CO2 25 04/08/2022    BUN 17 04/08/2022    CREATININE 0.8 04/08/2022    GLU 88 04/08/2022    MG 2.0 04/08/2022    AST 17 04/08/2022    ALT 13 04/08/2022    ALBUMIN 3.8 04/08/2022    PROT 7.1 04/08/2022    BILITOT 0.5 04/08/2022    WBC 6.63 04/08/2022    HGB 13.3 04/08/2022    HCT 41.6 04/08/2022    MCV 98 04/08/2022     04/08/2022    TSH 1.504 04/08/2022       Lab Results   Component Value Date    CHOL 156 04/08/2022    HDL 68 04/08/2022    TRIG 58 04/08/2022       Lab Results   Component Value Date    LDLCALC 76.4 04/08/2022       Urine Tests:  No results found for: COLORU, APPEARANCEUA, PHUR, SPECGRAV, PROTEINUA, GLUCUA, KETONESU, BILIRUBINUA, OCCULTUA, NITRITE, UROBILINOGEN, LEUKOCYTESUR, PROTEINURINE, CREATRANDUR, UTPCR    Imaging:     Echocardiogram  None    Stress testing  Treadmill 4/18/22    The EKG portion of this study iduring the stress test.    There were no arrhythmias during stress.    The exercise capacity was above average.s negative for ischemia.    The patient reported chest pain     Cath Lab  None    Other  Holter 4/18/22  Sinus rhythm  Normal heart rate behavior  Very rare ectopy  Symptoms correlating with normal sinus rhythm    Diary  The diary was properly " completed.   There were occasional hookup related artifacts. Overall, the study was of adequate quality. The tape was adequate (0 days , 48 hours, 0 minutes).    There was an episode of dizziness reported. The corresponding rhythm strips revealed the following: the rhythm was sinus rhythm at 86 bpm with without ectopy.   There was an episode of wheezing reported. The corresponding rhythm strips revealed the following: the rhythm was sinus rhythm at 64 bpm with without ectopy.   There was an episode of palpitations reported. The corresponding rhythm strips revealed the following: the rhythm was sinus tachycardia at 150 bpm with without ectopy.   There was an episode of dizziness reported. The corresponding rhythm strips revealed the following: the rhythm was sinus rhythm at 65 bpm with without ectopy.     EK22 - sinus lynn (personally reviewed)    Assessment:     1. Dyspnea on exertion    2. Syncope, unspecified syncope type    3. Tachycardia    4. Palpitations        Plan:     Syncope  Vasovagal by history  Discussed hydration, lessening caffeine/stimulants/alcohol  Discussed lying/sitting down when symptoms start  Echo    Tachycardia  Dyspnea on exertion  Palpitations  Normal treadmill, holter  Awaiting echo    Signed:  Tapan Nickerson MD  Cardiology     2022 8:40 AM    Follow-up:     Future Appointments   Date Time Provider Department Center   2022  4:00 PM ECHO, [facility] ECHOSTR [facility]   2022  8:00 AM Jesse Nickerson III, MD [facility] CARDIO [place]

## 2022-07-11 ENCOUNTER — OFFICE VISIT (OUTPATIENT)
Dept: CARDIOLOGY | Facility: CLINIC | Age: 32
End: 2022-07-11
Payer: COMMERCIAL

## 2022-07-11 ENCOUNTER — PATIENT MESSAGE (OUTPATIENT)
Dept: ADMINISTRATIVE | Facility: HOSPITAL | Age: 32
End: 2022-07-11
Payer: COMMERCIAL

## 2022-07-11 VITALS
DIASTOLIC BLOOD PRESSURE: 70 MMHG | WEIGHT: 160.94 LBS | BODY MASS INDEX: 26.81 KG/M2 | SYSTOLIC BLOOD PRESSURE: 106 MMHG | HEIGHT: 65 IN

## 2022-07-11 DIAGNOSIS — R55 SYNCOPE, UNSPECIFIED SYNCOPE TYPE: ICD-10-CM

## 2022-07-11 DIAGNOSIS — R06.09 DYSPNEA ON EXERTION: Primary | ICD-10-CM

## 2022-07-11 DIAGNOSIS — R00.2 PALPITATIONS: ICD-10-CM

## 2022-07-11 DIAGNOSIS — R00.0 TACHYCARDIA: ICD-10-CM

## 2022-07-11 PROCEDURE — 3074F PR MOST RECENT SYSTOLIC BLOOD PRESSURE < 130 MM HG: ICD-10-PCS | Mod: CPTII,S$GLB,, | Performed by: INTERNAL MEDICINE

## 2022-07-11 PROCEDURE — 99999 PR PBB SHADOW E&M-EST. PATIENT-LVL III: ICD-10-PCS | Mod: PBBFAC,,, | Performed by: INTERNAL MEDICINE

## 2022-07-11 PROCEDURE — 99203 OFFICE O/P NEW LOW 30 MIN: CPT | Mod: S$GLB,,, | Performed by: INTERNAL MEDICINE

## 2022-07-11 PROCEDURE — 99203 PR OFFICE/OUTPT VISIT, NEW, LEVL III, 30-44 MIN: ICD-10-PCS | Mod: S$GLB,,, | Performed by: INTERNAL MEDICINE

## 2022-07-11 PROCEDURE — 3074F SYST BP LT 130 MM HG: CPT | Mod: CPTII,S$GLB,, | Performed by: INTERNAL MEDICINE

## 2022-07-11 PROCEDURE — 3008F PR BODY MASS INDEX (BMI) DOCUMENTED: ICD-10-PCS | Mod: CPTII,S$GLB,, | Performed by: INTERNAL MEDICINE

## 2022-07-11 PROCEDURE — 3008F BODY MASS INDEX DOCD: CPT | Mod: CPTII,S$GLB,, | Performed by: INTERNAL MEDICINE

## 2022-07-11 PROCEDURE — 1159F PR MEDICATION LIST DOCUMENTED IN MEDICAL RECORD: ICD-10-PCS | Mod: CPTII,S$GLB,, | Performed by: INTERNAL MEDICINE

## 2022-07-11 PROCEDURE — 1159F MED LIST DOCD IN RCRD: CPT | Mod: CPTII,S$GLB,, | Performed by: INTERNAL MEDICINE

## 2022-07-11 PROCEDURE — 99999 PR PBB SHADOW E&M-EST. PATIENT-LVL III: CPT | Mod: PBBFAC,,, | Performed by: INTERNAL MEDICINE

## 2022-07-11 PROCEDURE — 3078F PR MOST RECENT DIASTOLIC BLOOD PRESSURE < 80 MM HG: ICD-10-PCS | Mod: CPTII,S$GLB,, | Performed by: INTERNAL MEDICINE

## 2022-07-11 PROCEDURE — 3078F DIAST BP <80 MM HG: CPT | Mod: CPTII,S$GLB,, | Performed by: INTERNAL MEDICINE

## 2022-08-02 ENCOUNTER — HOSPITAL ENCOUNTER (OUTPATIENT)
Dept: CARDIOLOGY | Facility: HOSPITAL | Age: 32
Discharge: HOME | End: 2022-08-02
Attending: INTERNAL MEDICINE
Payer: COMMERCIAL

## 2022-08-02 ENCOUNTER — PATIENT MESSAGE (OUTPATIENT)
Dept: CARDIOLOGY | Facility: CLINIC | Age: 32
End: 2022-08-02
Payer: COMMERCIAL

## 2022-08-02 VITALS
HEART RATE: 57 BPM | DIASTOLIC BLOOD PRESSURE: 78 MMHG | SYSTOLIC BLOOD PRESSURE: 120 MMHG | HEIGHT: 65 IN | WEIGHT: 160.94 LBS | BODY MASS INDEX: 26.81 KG/M2

## 2022-08-02 DIAGNOSIS — R55 SYNCOPE, UNSPECIFIED SYNCOPE TYPE: ICD-10-CM

## 2022-08-02 LAB
ASCENDING AORTA: 2.3 CM
AV INDEX (PROSTH): 0.87
AV MEAN GRADIENT: 4 MMHG
AV PEAK GRADIENT: 8 MMHG
AV VALVE AREA: 2.54 CM2
AV VELOCITY RATIO: 0.87
BSA FOR ECHO PROCEDURE: 1.83 M2
CV ECHO LV RWT: 0.35 CM
DOP CALC AO PEAK VEL: 1.45 M/S
DOP CALC AO VTI: 30.77 CM
DOP CALC LVOT AREA: 2.9 CM2
DOP CALC LVOT DIAMETER: 1.93 CM
DOP CALC LVOT PEAK VEL: 1.26 M/S
DOP CALC LVOT STROKE VOLUME: 78.22 CM3
DOP CALCLVOT PEAK VEL VTI: 26.75 CM
E WAVE DECELERATION TIME: 206.25 MSEC
E/A RATIO: 2
E/E' RATIO: 7.86 M/S
ECHO LV POSTERIOR WALL: 0.78 CM (ref 0.6–1.1)
EJECTION FRACTION: 65 %
FRACTIONAL SHORTENING: 32 % (ref 28–44)
INTERVENTRICULAR SEPTUM: 0.82 CM (ref 0.6–1.1)
IVRT: 79.92 MSEC
LA MAJOR: 5 CM
LA MINOR: 5.02 CM
LA WIDTH: 3.49 CM
LEFT ATRIUM SIZE: 3.73 CM
LEFT ATRIUM VOLUME INDEX: 30.8 ML/M2
LEFT ATRIUM VOLUME: 55.44 CM3
LEFT INTERNAL DIMENSION IN SYSTOLE: 2.99 CM (ref 2.1–4)
LEFT VENTRICLE DIASTOLIC VOLUME INDEX: 48.81 ML/M2
LEFT VENTRICLE DIASTOLIC VOLUME: 87.86 ML
LEFT VENTRICLE MASS INDEX: 61 G/M2
LEFT VENTRICLE SYSTOLIC VOLUME INDEX: 19.3 ML/M2
LEFT VENTRICLE SYSTOLIC VOLUME: 34.69 ML
LEFT VENTRICULAR INTERNAL DIMENSION IN DIASTOLE: 4.4 CM (ref 3.5–6)
LEFT VENTRICULAR MASS: 109.44 G
LV LATERAL E/E' RATIO: 6.33 M/S
LV SEPTAL E/E' RATIO: 10.36 M/S
MV A" WAVE DURATION": 10.56 MSEC
MV PEAK A VEL: 0.57 M/S
MV PEAK E VEL: 1.14 M/S
MV STENOSIS PRESSURE HALF TIME: 59.81 MS
MV VALVE AREA P 1/2 METHOD: 3.68 CM2
PISA TR MAX VEL: 2.25 M/S
PULM VEIN S/D RATIO: 1.35
PV PEAK D VEL: 0.43 M/S
PV PEAK S VEL: 0.58 M/S
RA MAJOR: 4.4 CM
RA PRESSURE: 3 MMHG
RA WIDTH: 3.57 CM
RIGHT VENTRICULAR END-DIASTOLIC DIMENSION: 3.01 CM
RV TISSUE DOPPLER FREE WALL SYSTOLIC VELOCITY 1 (APICAL 4 CHAMBER VIEW): 11.04 CM/S
SINUS: 2.78 CM
STJ: 2.13 CM
TDI LATERAL: 0.18 M/S
TDI SEPTAL: 0.11 M/S
TDI: 0.15 M/S
TR MAX PG: 20 MMHG
TRICUSPID ANNULAR PLANE SYSTOLIC EXCURSION: 2.14 CM
TV REST PULMONARY ARTERY PRESSURE: 23 MMHG

## 2022-08-02 PROCEDURE — 93306 TTE W/DOPPLER COMPLETE: CPT

## 2022-08-02 PROCEDURE — 93306 TTE W/DOPPLER COMPLETE: CPT | Mod: 26,,, | Performed by: INTERNAL MEDICINE

## 2022-08-02 PROCEDURE — 93306 ECHO (CUPID ONLY): ICD-10-PCS | Mod: 26,,, | Performed by: INTERNAL MEDICINE

## 2022-10-10 ENCOUNTER — PATIENT MESSAGE (OUTPATIENT)
Dept: ADMINISTRATIVE | Facility: HOSPITAL | Age: 32
End: 2022-10-10
Payer: COMMERCIAL

## 2023-04-03 ENCOUNTER — PATIENT MESSAGE (OUTPATIENT)
Dept: ADMINISTRATIVE | Facility: HOSPITAL | Age: 33
End: 2023-04-03
Payer: COMMERCIAL

## 2023-11-17 RX ORDER — METHYLPREDNISOLONE 4 MG/1
TABLET ORAL
Qty: 21 EACH | Refills: 0 | Status: SHIPPED | OUTPATIENT
Start: 2023-11-17 | End: 2023-12-08

## 2023-11-17 RX ORDER — ALBUTEROL SULFATE 90 UG/1
2 AEROSOL, METERED RESPIRATORY (INHALATION) EVERY 6 HOURS PRN
Qty: 18 G | Refills: 0 | Status: SHIPPED | OUTPATIENT
Start: 2023-11-17 | End: 2024-11-16

## 2024-02-07 ENCOUNTER — OFFICE VISIT (OUTPATIENT)
Dept: PRIMARY CARE CLINIC | Facility: CLINIC | Age: 34
End: 2024-02-07
Payer: COMMERCIAL

## 2024-02-07 VITALS
HEIGHT: 65 IN | BODY MASS INDEX: 27.63 KG/M2 | WEIGHT: 165.81 LBS | SYSTOLIC BLOOD PRESSURE: 116 MMHG | DIASTOLIC BLOOD PRESSURE: 68 MMHG | OXYGEN SATURATION: 97 % | HEART RATE: 65 BPM

## 2024-02-07 DIAGNOSIS — Z00.00 ANNUAL PHYSICAL EXAM: Primary | ICD-10-CM

## 2024-02-07 DIAGNOSIS — N92.6 IRREGULAR MENSTRUAL CYCLE: ICD-10-CM

## 2024-02-07 PROCEDURE — 99395 PREV VISIT EST AGE 18-39: CPT | Mod: S$GLB,,, | Performed by: STUDENT IN AN ORGANIZED HEALTH CARE EDUCATION/TRAINING PROGRAM

## 2024-02-07 PROCEDURE — 1160F RVW MEDS BY RX/DR IN RCRD: CPT | Mod: CPTII,S$GLB,, | Performed by: STUDENT IN AN ORGANIZED HEALTH CARE EDUCATION/TRAINING PROGRAM

## 2024-02-07 PROCEDURE — 99999 PR PBB SHADOW E&M-EST. PATIENT-LVL III: CPT | Mod: PBBFAC,,, | Performed by: STUDENT IN AN ORGANIZED HEALTH CARE EDUCATION/TRAINING PROGRAM

## 2024-02-07 PROCEDURE — 1159F MED LIST DOCD IN RCRD: CPT | Mod: CPTII,S$GLB,, | Performed by: STUDENT IN AN ORGANIZED HEALTH CARE EDUCATION/TRAINING PROGRAM

## 2024-02-07 PROCEDURE — 3008F BODY MASS INDEX DOCD: CPT | Mod: CPTII,S$GLB,, | Performed by: STUDENT IN AN ORGANIZED HEALTH CARE EDUCATION/TRAINING PROGRAM

## 2024-02-07 PROCEDURE — 3074F SYST BP LT 130 MM HG: CPT | Mod: CPTII,S$GLB,, | Performed by: STUDENT IN AN ORGANIZED HEALTH CARE EDUCATION/TRAINING PROGRAM

## 2024-02-07 PROCEDURE — 3078F DIAST BP <80 MM HG: CPT | Mod: CPTII,S$GLB,, | Performed by: STUDENT IN AN ORGANIZED HEALTH CARE EDUCATION/TRAINING PROGRAM

## 2024-02-07 NOTE — PROGRESS NOTES
SUBJECTIVE     Chief Complaint   Patient presents with    Annual Exam       HPI  Renetta Deshpande is a 33 y.o. female with medical diagnoses as listed in the medical history and problem list that presents for annual exam.     PCP: Dr. Samantha Dow    Pt is UTD on age appropriate CA screening.    Family, social, surgical Hx reviewed     Patient states her cycles have become more irregular over the last year. They usually come every 4 weeks, but states duration of cycle length is changing.    Health Maintenance         Date Due Completion Date    Cervical Cancer Screening Never done ---    Hemoglobin A1c (Diabetic Prevention Screening) Never done ---    COVID-19 Vaccine (3 - 2023-24 season) 09/01/2023 8/24/2021    TETANUS VACCINE 05/01/2024 5/1/2014                PAST MEDICAL HISTORY:  History reviewed. No pertinent past medical history.    PAST SURGICAL HISTORY:  Past Surgical History:   Procedure Laterality Date    ADENOIDECTOMY  1989    TONSILLECTOMY         SOCIAL HISTORY:  Social History     Socioeconomic History    Marital status:    Tobacco Use    Smoking status: Never    Smokeless tobacco: Never   Substance and Sexual Activity    Alcohol use: Yes     Alcohol/week: 2.0 standard drinks of alcohol     Types: 2 Drinks containing 0.5 oz of alcohol per week    Drug use: Never    Sexual activity: Yes     Partners: Male     Comment: Pill       FAMILY HISTORY:  Family History   Problem Relation Age of Onset    Migraines Mother     No Known Problems Father     Hypertension Maternal Grandmother     Heart failure Maternal Grandmother     Osteoarthritis Maternal Grandmother     Breast cancer Maternal Grandmother         late 70s    Heart disease Maternal Grandmother     Kidney disease Maternal Grandmother     Heart failure Maternal Grandfather     Osteoarthritis Maternal Grandfather     Osteoarthritis Paternal Grandmother     Diabetes Paternal Grandmother     Cancer Paternal Grandmother     Heart failure  "Paternal Grandfather     Colon cancer Neg Hx        ALLERGIES AND MEDICATIONS: updated and reviewed.  Review of patient's allergies indicates:  No Known Allergies  Current Outpatient Medications   Medication Sig Dispense Refill    albuterol (VENTOLIN HFA) 90 mcg/actuation inhaler Inhale 2 puffs into the lungs every 6 (six) hours as needed for Wheezing. Rescue 18 g 0    BLISOVI FE 1/20, 28, 1 mg-20 mcg (21)/75 mg (7) per tablet Take 1 tablet by mouth once daily.       No current facility-administered medications for this visit.       ROS  Review of Systems   Constitutional:  Negative for fever and weight loss.   Respiratory:  Negative for cough and shortness of breath.    Cardiovascular:  Negative for chest pain and palpitations.   Gastrointestinal:  Negative for abdominal pain, constipation, diarrhea, nausea and vomiting.   Genitourinary:  Negative for dysuria.   Musculoskeletal:  Negative for back pain and joint pain.   Skin:  Negative for rash.   Neurological:  Negative for dizziness, weakness and headaches.   Psychiatric/Behavioral:  Negative for depression. The patient is not nervous/anxious.          OBJECTIVE     Physical Exam  Vitals:    02/07/24 1121   BP: 116/68   Pulse: 65    Body mass index is 27.59 kg/m².  Weight: 75.2 kg (165 lb 12.6 oz)   Height: 5' 5" (165.1 cm)     Physical Exam  Cardiovascular:      Rate and Rhythm: Normal rate and regular rhythm.               ASSESSMENT     33 y.o. female with     1. Annual physical exam    2. Irregular menstrual cycle        PLAN:     1. Annual physical exam  -     Ambulatory referral/consult to Obstetrics / Gynecology; Future; Expected date: 02/14/2024  -     Hemoglobin A1C; Future; Expected date: 02/07/2024  -     TSH; Future; Expected date: 02/07/2024  -     Lipid Panel; Future; Expected date: 02/07/2024  -     Comprehensive Metabolic Panel; Future; Expected date: 02/07/2024  -     CBC Auto Differential; Future; Expected date: 02/07/2024    2. Irregular " menstrual cycle  -     FOLLICLE STIMULATING HORMONE; Future; Expected date: 02/07/2024        Discussed age and gender appropriate screenings at this visit and encouraged a healthy diet low in simple carbohydrates, and increased physical activity.  Counseled on medically appropriate vaccines based on age and current health condition.  Screening test reviewed and discussed with patient.      RTC in 1 year with PCP for annual exam    Emely Avalos MD

## 2024-02-15 ENCOUNTER — LAB VISIT (OUTPATIENT)
Dept: LAB | Facility: HOSPITAL | Age: 34
End: 2024-02-15
Payer: COMMERCIAL

## 2024-02-15 DIAGNOSIS — Z00.00 ANNUAL PHYSICAL EXAM: ICD-10-CM

## 2024-02-15 DIAGNOSIS — N92.6 IRREGULAR MENSTRUAL CYCLE: ICD-10-CM

## 2024-02-15 LAB
ALBUMIN SERPL BCP-MCNC: 3.6 G/DL (ref 3.5–5.2)
ALP SERPL-CCNC: 48 U/L (ref 55–135)
ALT SERPL W/O P-5'-P-CCNC: 15 U/L (ref 10–44)
ANION GAP SERPL CALC-SCNC: 7 MMOL/L (ref 8–16)
AST SERPL-CCNC: 19 U/L (ref 10–40)
BASOPHILS # BLD AUTO: 0.02 K/UL (ref 0–0.2)
BASOPHILS NFR BLD: 0.3 % (ref 0–1.9)
BILIRUB SERPL-MCNC: 0.5 MG/DL (ref 0.1–1)
BUN SERPL-MCNC: 14 MG/DL (ref 6–20)
CALCIUM SERPL-MCNC: 9.2 MG/DL (ref 8.7–10.5)
CHLORIDE SERPL-SCNC: 106 MMOL/L (ref 95–110)
CHOLEST SERPL-MCNC: 169 MG/DL (ref 120–199)
CHOLEST/HDLC SERPL: 2 {RATIO} (ref 2–5)
CO2 SERPL-SCNC: 26 MMOL/L (ref 23–29)
CREAT SERPL-MCNC: 0.8 MG/DL (ref 0.5–1.4)
DIFFERENTIAL METHOD BLD: ABNORMAL
EOSINOPHIL # BLD AUTO: 0.1 K/UL (ref 0–0.5)
EOSINOPHIL NFR BLD: 2 % (ref 0–8)
ERYTHROCYTE [DISTWIDTH] IN BLOOD BY AUTOMATED COUNT: 12.2 % (ref 11.5–14.5)
EST. GFR  (NO RACE VARIABLE): >60 ML/MIN/1.73 M^2
ESTIMATED AVG GLUCOSE: 91 MG/DL (ref 68–131)
FSH SERPL-ACNC: 0.11 MIU/ML
GLUCOSE SERPL-MCNC: 87 MG/DL (ref 70–110)
HBA1C MFR BLD: 4.8 % (ref 4–5.6)
HCT VFR BLD AUTO: 41.7 % (ref 37–48.5)
HDLC SERPL-MCNC: 84 MG/DL (ref 40–75)
HDLC SERPL: 49.7 % (ref 20–50)
HGB BLD-MCNC: 13.6 G/DL (ref 12–16)
IMM GRANULOCYTES # BLD AUTO: 0.02 K/UL (ref 0–0.04)
IMM GRANULOCYTES NFR BLD AUTO: 0.3 % (ref 0–0.5)
LDLC SERPL CALC-MCNC: 75.4 MG/DL (ref 63–159)
LYMPHOCYTES # BLD AUTO: 2.9 K/UL (ref 1–4.8)
LYMPHOCYTES NFR BLD: 47.8 % (ref 18–48)
MCH RBC QN AUTO: 32.5 PG (ref 27–31)
MCHC RBC AUTO-ENTMCNC: 32.6 G/DL (ref 32–36)
MCV RBC AUTO: 100 FL (ref 82–98)
MONOCYTES # BLD AUTO: 0.4 K/UL (ref 0.3–1)
MONOCYTES NFR BLD: 7.3 % (ref 4–15)
NEUTROPHILS # BLD AUTO: 2.6 K/UL (ref 1.8–7.7)
NEUTROPHILS NFR BLD: 42.3 % (ref 38–73)
NONHDLC SERPL-MCNC: 85 MG/DL
NRBC BLD-RTO: 0 /100 WBC
PLATELET # BLD AUTO: 210 K/UL (ref 150–450)
PMV BLD AUTO: 12.2 FL (ref 9.2–12.9)
POTASSIUM SERPL-SCNC: 4.3 MMOL/L (ref 3.5–5.1)
PROT SERPL-MCNC: 6.7 G/DL (ref 6–8.4)
RBC # BLD AUTO: 4.19 M/UL (ref 4–5.4)
SODIUM SERPL-SCNC: 139 MMOL/L (ref 136–145)
TRIGL SERPL-MCNC: 48 MG/DL (ref 30–150)
TSH SERPL DL<=0.005 MIU/L-ACNC: 0.89 UIU/ML (ref 0.4–4)
WBC # BLD AUTO: 6.05 K/UL (ref 3.9–12.7)

## 2024-02-15 PROCEDURE — 83036 HEMOGLOBIN GLYCOSYLATED A1C: CPT | Performed by: STUDENT IN AN ORGANIZED HEALTH CARE EDUCATION/TRAINING PROGRAM

## 2024-02-15 PROCEDURE — 80061 LIPID PANEL: CPT | Performed by: STUDENT IN AN ORGANIZED HEALTH CARE EDUCATION/TRAINING PROGRAM

## 2024-02-15 PROCEDURE — 84443 ASSAY THYROID STIM HORMONE: CPT | Performed by: STUDENT IN AN ORGANIZED HEALTH CARE EDUCATION/TRAINING PROGRAM

## 2024-02-15 PROCEDURE — 80053 COMPREHEN METABOLIC PANEL: CPT | Performed by: STUDENT IN AN ORGANIZED HEALTH CARE EDUCATION/TRAINING PROGRAM

## 2024-02-15 PROCEDURE — 85025 COMPLETE CBC W/AUTO DIFF WBC: CPT | Performed by: STUDENT IN AN ORGANIZED HEALTH CARE EDUCATION/TRAINING PROGRAM

## 2024-02-15 PROCEDURE — 36415 COLL VENOUS BLD VENIPUNCTURE: CPT | Mod: PO | Performed by: STUDENT IN AN ORGANIZED HEALTH CARE EDUCATION/TRAINING PROGRAM

## 2024-02-15 PROCEDURE — 83001 ASSAY OF GONADOTROPIN (FSH): CPT | Performed by: STUDENT IN AN ORGANIZED HEALTH CARE EDUCATION/TRAINING PROGRAM

## 2024-03-26 ENCOUNTER — PATIENT MESSAGE (OUTPATIENT)
Dept: PRIMARY CARE CLINIC | Facility: CLINIC | Age: 34
End: 2024-03-26
Payer: COMMERCIAL

## 2024-05-14 ENCOUNTER — PATIENT MESSAGE (OUTPATIENT)
Dept: PSYCHIATRY | Facility: CLINIC | Age: 34
End: 2024-05-14
Payer: COMMERCIAL

## 2024-05-16 ENCOUNTER — OFFICE VISIT (OUTPATIENT)
Dept: PSYCHIATRY | Facility: CLINIC | Age: 34
End: 2024-05-16
Payer: COMMERCIAL

## 2024-05-16 DIAGNOSIS — F90.2 ATTENTION DEFICIT HYPERACTIVITY DISORDER, COMBINED TYPE: ICD-10-CM

## 2024-05-16 DIAGNOSIS — F41.1 GENERALIZED ANXIETY DISORDER: Primary | ICD-10-CM

## 2024-05-16 PROCEDURE — 90834 PSYTX W PT 45 MINUTES: CPT | Mod: 95,,, | Performed by: PSYCHOLOGIST

## 2024-05-16 PROCEDURE — 3044F HG A1C LEVEL LT 7.0%: CPT | Mod: CPTII,95,, | Performed by: PSYCHOLOGIST

## 2024-05-16 NOTE — PROGRESS NOTES
Individual Psychotherapy (PhD/LCSW)    5/16/2024    Site:  [facility]         Therapeutic Intervention: Met with patient.  Outpatient - Supportive psychotherapy 45 min - CPT Code 02953    Chief complaint/reason for encounter: attention deficit, depression, and anxiety     Interval history and content of current session:  This was a preliminary appointment in preparation for psychological testing.  Renetta was diagnosed with ADHD as a child and actually took medication until high school when she decided to discontinue it.  She now thinks she needs a reassessment of ADHD in addition to assessments regarding anxiety and depression.  She will be applying to programs at [employer] and [employer] to become a surgical tech.  For admission purposes as well as ongoing testing, she needs accommodations.  Her goal is for psychological testing to confirm whether or not she has ADHD and any other diagnostic problems.    Treatment plan:  Target symptoms: depression, distractability, lack of focus, anxiety   Why chosen therapy is appropriate versus another modality: relevant to diagnosis  Outcome monitoring methods: self-report  Therapeutic intervention type: supportive psychotherapy    Risk parameters:  Patient reports no suicidal ideation  Patient reports no homicidal ideation  Patient reports no self-injurious behavior  Patient reports no violent behavior    Verbal deficits: None    Patient's response to intervention:  The patient's response to intervention is accepting.    Progress toward goals and other mental status changes:  The patient's progress toward goals is  too soon to say .    Diagnosis: 314.01  300.02  No diagnosis found.    Plan: Emotional and ADHD assessment    Return to clinic: as scheduled    Length of Service (minutes): 45

## 2024-05-16 NOTE — PROGRESS NOTES
The patient location is: work [place]  The chief complaint leading to consultation is: ADHD/ANXIETY    Visit type: audiovisual    Face to Face time with patient: 45 minutes of total time spent on the encounter, which includes face to face time and non-face to face time preparing to see the patient (eg, review of tests), Obtaining and/or reviewing separately obtained history, Documenting clinical information in the electronic or other health record, Independently interpreting results (not separately reported) and communicating results to the patient/family/caregiver, or Care coordination (not separately reported).         Each patient to whom he or she provides medical services by telemedicine is:  (1) informed of the relationship between the physician and patient and the respective role of any other health care provider with respect to management of the patient; and (2) notified that he or she may decline to receive medical services by telemedicine and may withdraw from such care at any time.    Notes:

## 2024-06-04 ENCOUNTER — PATIENT MESSAGE (OUTPATIENT)
Dept: PSYCHIATRY | Facility: CLINIC | Age: 34
End: 2024-06-04
Payer: COMMERCIAL

## 2024-08-15 ENCOUNTER — OFFICE VISIT (OUTPATIENT)
Dept: PSYCHIATRY | Facility: CLINIC | Age: 34
End: 2024-08-15
Payer: COMMERCIAL

## 2024-08-15 DIAGNOSIS — F90.2 ATTENTION DEFICIT HYPERACTIVITY DISORDER, COMBINED TYPE: ICD-10-CM

## 2024-08-15 DIAGNOSIS — F41.1 GENERALIZED ANXIETY DISORDER: Primary | ICD-10-CM

## 2024-08-15 DIAGNOSIS — F34.1 DYSTHYMIA: ICD-10-CM

## 2024-08-15 NOTE — PROGRESS NOTES
[facility] [address] [place]  [address]  [phone]    PSYCHO-EDUCATIONAL EVALUATION    Renetta Deshpande     [id number]     1990     Dates of Evaluation: 7/2/24, 8/15/24    34 y.o.     REFERRED BY: Self -Referral                         REASON FOR REFERRAL: Renetta sought this evaluation in order to provide an an updated, in-depth look at her cognitive functioning, attention and concentration, and emotional well-being.       EVALUATED BY:  Cj Aguiar, Ph.D., Clinical Psychologist  Lisa Suarez, Psychometrician    EVALUATION PROCEDURES AND TIMES:   Conducted by Psychologist:   Clinical Interview    Interpretation and report of test data  Integration of information from interviews, medical record, and testing data    Conducted by Psychometrician:  NEIL lV(core tests)  Brown ADD Scales  Estrella's Anxiety Indes  Estrella's Depression Index-2  Sentence Completion Form  Behavior Rating Inventory of Executive Functioning-Self-Report Version  Millon Clinical Multiaxial Inventory lll   Leticia' Continuous Performance Test-III  Tobias Gestalt Test of Visual Motor Integration    CPT Codes and Units:  96138______ 96139______ 22338 _______ 79206 ______ 62624            96131_____ Technicians Time ________ minutes  Psychologist Testing Time ________ minutes   Psychologist Test Evaluation Services ________ minutes  Computer Administered Test - 44687  Rating Scales - 52571 ____     Psychological Evaluation   ([id number] )  Page 2       EVALUATION FINDINGS        INTAKE INTERVIEW:  I spoke with Mrs. Deshpande in order to review the goals for this evaluation as well as her history.  In approximately 2nd grade, she was evaluated and diagnosed with ADHD.  That evaluation is no longer available.  She has been on a number of different medications to help her with her ADHD (Ritalin and Concerta) in addition to having trials on Wellbutrin and Lexipro for anxiety and depression.  She decided to discontinue  medication at approximately 12 years old due to the side effects.  In addition to attention regulation problems, she described ongoing impulsivity as well as hyperactivity.  Difficulties were reported in prioritizing planning and decision making.  Anxiety is also a significant factor, something she feels on a continuous basis.    Mrs. Deshpande was  5 years ago.  She brought her daughter, Chika, to me for an assessment which stimulated her desire to seek this evaluation.  She has future plans to return to school and study to be a surgical technician.  Mrs. Deshpande has never done well on tests and is concerned about the admissions test as well as ongoing tests in school..  Difficulties were reported in both reading and math.    As mentioned above anxiety is an ongoing obstacle for her.  Depression is also omnipresent and her mood is often low.  She has seen a therapist for these emotional problems and described the intervention as being helpful.  No difficulties reported with anger management.    I reviewed the symptoms of ADHD with Mrs. Deshpande. The following symptoms of ADHD were confirmed:    Often has difficulty sustaining attention in tasks.  Often does not seem to listen when spoken to directly (currently not a problem but was a problem in the past).  Often has difficulty organizing tasks and activities.  Often is reluctant to engage in tasks that require sustained mental effort.  Often loses things necessary for tasks or activities.  Is easily distracted.  Often forgetful in daily activities.  Symptoms are present both in family life, work, and social interaction.  Do you consider your problems with attention an impairment?  Yes        FAMILY HISTORY:  Mrs. Deshpande was  in 2010 and  in 2019.  She has one child, Chika.  The parenting partnership was described as being a lot better at this point.      MEDICAL HISTORY:  Dr. Samantha Dow is Mrs. Deshpande's PCP.  She described early  manifestations of ADHD in her life which was treated with medication.  No significant illnesses, hospitalizations or accidents were reported.  Currently, she is only on birth control.  She has been on both Ritalin and Concerta for ADHD as well as Wellbutrin and Lexapro for emotional symptoms.  Her mother, apparently, had dyslexia, and there is a family history of ADHD as well as anxiety and depression.  She reported that the medication for ADHD did help but it was discontinued because, I wanted to just be me.         In summary, the results of this review of background information indicated that Mrs. Deshpande experienced early emotional trauma and  her  approximately 5 years ago.  She has a daughter, Chika, and the parenting partnership appears to be improving over time.  She sought this evaluation in order to get an update on her ADHD and to explore any other problems that needed to be addressed.      Assessment of Intellectual Functioning   ([id number]  )  Page 1    TEST DATA     ASSESSMENT OF INTELLECTUAL FUNCTIONING     BEHAVIORAL OBSERVATIONS:  Mrs. Deshpande was administered a battery of tests to assess her cognitive functioning, attention concentration, and executive skills by our psychometrist Mrs. Gonzalez Erick.  Ms. Suarez said that Ms. Deshpande was fully invested in doing her best during the evaluation, showed adequate focus and was cooperative throughout.  Thus, the data presented below was thought to be reliable.    TEST RESULTS:  On the WAIS lV Mrs. Deshpande's Full Scale IQ was at the 18th percentile.  However, there was considerable variability among the four cognitive clusters assessed.  Her Verbal Comprehension was at the 16th percentile whereas Perceptual Reasoning was at the 61st.  Working Memory was clearly her weakest cognitive composite, dropping to the 4th percentile while her Processing Speed was at the 18th.    There was little variability within the Verbal Comprehension factor.   "Her ability to form verbal concepts was at the 25th percentile, and both Ms. Deshpande's vocabulary and general fund of information were at the 16th.    Much stronger scores were found in Perceptual Reasoning.  Two different tests of visual spatial, analytic skills were administered.  Visual Puzzles used a 2-dimensional format and Block Design a 3-dimensional one.  On the former she scored at the 75th percentile, and on the latter, the 50th.  One more test in this cognitive cluster was Matrix Reasoning where she was challenged to discern patterns in abstract visual information.  Her score was at the 50th percentile.  Mrs. Deshpande said that she has always done better in school when she could use her visual reasoning skills.      It is very hard for her to keep information in mind as Mrs. Deshpande is engaged in a task.  Information goes in one ear, and out the other."  This problem was reflected by her low scores in Working Memory.  Digit Span, where she had to remember number sequences in a variety of formats, led to a score at the 9th percentile.  On the Arithmetic subtest she was read word problems aloud and had to keep the information in mind while calculating.  Her score was at the 5th percentile.    Both tests in the Processing Speed cluster measured her ability to balance speed, accuracy and efficiency.  On Coding, she had to transfer information from one part of the page to another in a fill in the blank process.  Her score was at the 37th percentile.  Symbol Search required her to differentiate between visual symbols for similarity verses difference.  Her score was at the 9th percentile.    On the Tobias Gestalt Test of Visual Motor Integration,  Mrs. Deshpande did well in reproducing the geometric shapes.  This was an un- timed test which may have helped her.  Her organization of the 9 designs was well done and she made no significant errors.           The Leticia' Continuous Performance Test-III is a " computer administered instrument that provides helpful information on a number of different aspects of attention and concentration including: attention endurance, attention adaptability, vigilance, and control over impulsivity and distractibility.     Mrs. Deshpande performed well on this computer test.  There were no atypical patterns which might typically be associated with ADHD.       The Brown ADD Scales is a self-report instrument that provides a patient's perspective on different aspects of ADHD. The components which comprise this scale include: Activation (Initiation of tasks) Attention Regulation, Effort, Emotional Regulation, and Working Memory.    The pattern on this self-report scale led to the conclusion that ADHD was highly probable.  The most significant elevation was in the area of attention management.  Mild but significant difficulties were found in activation (problems with initiation of tasks) application of effort, management of affect, and problems with Working Memory.    Mrs. Deshpande completed the Self Report Form of the Behavior Rating Inventory of Executive Functioning. Executive functioning represents the steering mechanisms that guide intelligence including:  adaptive attention, flexibility in problem solving, self-monitoring, adaptive inhibition of impulses, and the capacity to follow through with intentions despite obstacles and distractions. Executive skills function as the commander in chief of one's resources by setting priorities, deploying attention, keeping goals in mind despite distractions, managing affect, and organizing time, responsibilities and materials. Three major indices are derived from these scales all having to do with self-regulation: behavioral, emotional and cognitive.     Mrs. Deshpande's profile indicated significant problems in all 3 aspects of executive functioning.  With regard to behavioral self-control she rated herself as often tapping her fingers or  bouncing her legs.  Adaptability is hard for her.  Often, she is bothered by having to deal with changes, and after having a problem she does not get over it easily.  She describes herself as often over reacting emotionally and that her mood changes frequently.  Often she has trouble concentrating on tasks and doing more than 1 thing at a time.  She said that frequently she gets overwhelmed by large tasks and prioritizing is difficult for her.    In summary, the results of this cognitive assessment as well as measures of her attention and concentration and executive skills help to provide an update on her functioning in these areas.  Clearly, Mrs. Deshpande is a visual thinker.  She said, consistent with the data, if I can see things and not just hear them, I am more likely to understand.   She has major difficulties with working memory which makes it challenging to carry out tasks,instructions and intentions.  However, she has adapted well and uses technology as well as post-it notes to stay on track.  Problems with attention and concentration persist, and she has difficulties in all 3 areas of ADHD, thus, has the diagnosis of ADHD-Combined Presentation.  It is very typical for individuals with ADHD to also have problems with executive functioning.  This was certainly the case with Mrs. Deshpande.    Assessment of Intellectual Functioning   ([id number]  )    The data sheet is as follows:      WAIS lV      WAIS lV IQ PERCENTILE   Full Scale IQ 86 18   Verbal Comprehension 85 16   Perceptual Reasoning 104 61   Working Memory 74 4   Processing Speed 86 18     VERBAL COMPREHENSION    Similarities 8   Vocabulary 7   Information 7       PERCEPTUAL REASONING    Block Design 10   Matrix Reasoning 10   Visual Puzzles 12       WORKING MEMORY    Digit Span 6   Arithmetic 5     PROCESSING SPEED    Symbol Search 6   Coding 9         Assessment of Social, Emotional and Behavioral Functioning   ([id number]  )  Page 1    ASSESSMENT OF  EMOTIONAL FUNCTIONING      Multiple indices were used to assess Mrs. Deshpande's emotional functioning.  The Estrella's Depression Index-2 provided a ratings scale of depressive symptoms in the 2 weeks prior to this administration.  The results of this index was a mild level of depression.  Mrs. Deshpande indicated that she felt sad much of the time and more discouraged about her future than she used to be.  She feels that she has failed more than she should have and does not consider herself as worthwhile and useful as she used to.    She also completed the Estrella's anxiety index.  She was asked to raise herself on various symptoms of anxiety in the week prior to this administration.  Mild symptoms were noted in difficulties relaxing, fear of the worst thing happening, moments of feeling nervous or terrified, indigestion, or sweating (not due to heart).  The overall index was a minimal level of anxiety.    She completed the Sentence Completion Form, an instrument where examinees are given the beginning of a sentence and have to complete it on their own. These written expressions are examined for areas of interest, conflict, relationships and outlook.     While there were clearly expressions of emotional challenge, the level of burden was in the mild-to-moderate range.  Mrs. Deshpande said that she feels overwhelmed and that her nerves will drive me crazy.   She expressed suffering from a lot of difficult challenges in life, and recalled regretfully failing in school, and not going back to school when it was easier.  Her mind, she wrote, runs like crazy.   The only  trouble is me.   She made it clear that significant problems existed with her father.    Mrs. Deshpande completed the Millon clinical multiaxial inventory lll  a computer scored measure of personality patterns, expressed concerns, and clinical syndromes. The reliability index indicated that he/she took the scale seriously.  With regards to clinical  personality patterns, the most prominent was compulsivity.  The primary clinical syndromes was generalized anxiety.  In the category of severe clinical syndromes, there were no prominent findings but major depression was listed as present.   In conclusion, this scale listed two possible DSM V diagnoses, major depression and generalized anxiety disorder.      In summary, the results of this assessment of Mrs. Deshpande's emotional well-being indicated that she is struggling with both anxiety and depression.  Taken together, the results of this assessment indicated overlapping conditions with cognitive vulnerabilities, ADHD Combined Presentation, and executive skills deficits on the one hand, and burdens of both anxiety and depression on the other.      DIAGNOSES: Generalized Anxiety Disorder (DSM V 300.02) (F41.1), ADHD-Combined presentation (DSM V 314.01) (F90.2), and Dysthymia (DSM V 300.4) (F34.1)      RECOMMENDATIONS:        1. Mrs. Deshpande sought this re-evaluation in order to get an update on her psychological profile.  There are co-existing conditions that need to be addressed.  From the standpoint of ADHD, it should be remembered that her subtype is the combined presentation of ADHD which means that she has significant symptoms of hyperactivity, impulsivity, and attention regulation problems.  Mrs. Deshpande was diagnosed with ADHD when a child and was on medication for number of years.  She discontinued the medication because she did not like the way it made her feel.  Now as a 34-year-old, she is reconsidering the use of medication for her ADHD.  She already has developed various compensatory and resourceful strategies to help manage her ADHD.  These include the use of technology as well as post-it notes.  It is a significant advantage that she has taken these steps which have proved successful in limiting the negative impact of ADHD.  Yet, it is still a major factor in her life, and consideration should  be given to going back on medication to make even more improvement not to mention experience much less stress.          2. In addition to her problems with self-regulation of attention, Mrs. Deshpande has significant problems with executive functioning.  It may be that getting back on medication provides a jump-start to improve her executive functioning.  If not, she might consider having some coaching on executive skills pinpoint how to be more skillful both at work day-to-day functioning.  In particular, her working memory is a significant obstacle to handling responsibilities.  The more she can understand how working memory impacts her life the more able she will be to develop more strategies to reduce that negative impact.          3. In addition, Mrs. Deshpande is struggling with emotional burdens of anxiety and depression.  She has received therapy to relieve some of the burdens of a traumatic childhood.  She said that the therapy did help, but understands that more work has to be done.  In addition, she and her   and that event has added more stress to her life.  She has said that when the time is right, she will re-engage in therapy.  In the meantime, receiving some pharmacological help for her anxiety and depression would be a jump-start in a positive direction.            4. Mrs. Deshpande plans to go back to school and pursue becoming a surgical technician.  In doing so, she will have to face admissions testing as well as tests and papers as part of the curriculum.  If she does make the decision to go back to school, I would recommend some educational testing to determine whether she has any underlying learning disabilities.  In any case, her co-existing diagnoses would open the door to academic/testing accommodations such as extended time on tests and exams, including admissions testing and access to environment with reduced distractions for test taking.  Other recommendations could be made  to facilitate her success, for example, having access to record presentations so she could listen to them as many times as needed basis.        Cj Aguiar, Ph.D.  Licensed Clinical Psychologist  [place] License #[id number] patient location is: work [place]  The chief complaint leading to consultation is: ADHD/Anxiety    Visit type: audiovisual    Face to Face time with patient: 45 Individual Psychotherapy (PhD/LCSW)    8/15/2024    Site:  [facility]         Therapeutic Intervention: Met with patient.  Outpatient - Insight oriented psychotherapy 45 min - CPT code 98565    Chief complaint/reason for encounter: attention deficit and anxiety     Interval history and content of current session: Reviewed the evaluation explaining the overlapping circles of cognitive/adhd/executive skills factors which overlap with chronic anxiety/depression. She has been in therapy which has been helpful dealing with early trauma. She also plans to ash back to school. If and when, she should get back in touch with me to help navigate. Suggested a medication consult to deal with the adhd side and the emotional side. She will get accommodations for tests.    Treatment plan:  Target symptoms: depression, distractability, lack of focus, anxiety   Why chosen therapy is appropriate versus another modality: relevant to diagnosis  Outcome monitoring methods: self-report  Therapeutic intervention type: insight oriented psychotherapy    Risk parameters:  Patient reports no suicidal ideation  Patient reports no homicidal ideation  Patient reports no self-injurious behavior  Patient reports no violent behavior    Verbal deficits: None    Patient's response to intervention:  The patient's response to intervention is accepting, motivated.    Progress toward goals and other mental status changes:  The patient's progress toward goals is good.    Diagnosis:   No diagnosis found.    Plan:  individual psychotherapy and medication management by  physician    Return to clinic: as needed    Length of Service (minutes): 45     minutes of total time spent on the encounter, which includes face to face time and non-face to face time preparing to see the patient (eg, review of tests), Obtaining and/or reviewing separately obtained history, Documenting clinical information in the electronic or other health record, Independently interpreting results (not separately reported) and communicating results to the patient/family/caregiver, or Care coordination (not separately reported).         Each patient to whom he or she provides medical services by telemedicine is:  (1) informed of the relationship between the physician and patient and the respective role of any other health care provider with respect to management of the patient; and (2) notified that he or she may decline to receive medical services by telemedicine and may withdraw from such care at any time.    Notes:

## 2024-10-24 ENCOUNTER — OFFICE VISIT (OUTPATIENT)
Dept: PRIMARY CARE CLINIC | Facility: CLINIC | Age: 34
End: 2024-10-24
Payer: COMMERCIAL

## 2024-10-24 VITALS
WEIGHT: 171.06 LBS | OXYGEN SATURATION: 98 % | BODY MASS INDEX: 28.5 KG/M2 | DIASTOLIC BLOOD PRESSURE: 68 MMHG | HEART RATE: 70 BPM | SYSTOLIC BLOOD PRESSURE: 124 MMHG | HEIGHT: 65 IN

## 2024-10-24 DIAGNOSIS — F90.9 ATTENTION DEFICIT HYPERACTIVITY DISORDER (ADHD), UNSPECIFIED ADHD TYPE: Primary | ICD-10-CM

## 2024-10-24 PROCEDURE — 3008F BODY MASS INDEX DOCD: CPT | Mod: CPTII,S$GLB,, | Performed by: INTERNAL MEDICINE

## 2024-10-24 PROCEDURE — 99214 OFFICE O/P EST MOD 30 MIN: CPT | Mod: S$GLB,,, | Performed by: INTERNAL MEDICINE

## 2024-10-24 PROCEDURE — 1159F MED LIST DOCD IN RCRD: CPT | Mod: CPTII,S$GLB,, | Performed by: INTERNAL MEDICINE

## 2024-10-24 PROCEDURE — 3044F HG A1C LEVEL LT 7.0%: CPT | Mod: CPTII,S$GLB,, | Performed by: INTERNAL MEDICINE

## 2024-10-24 PROCEDURE — 99999 PR PBB SHADOW E&M-EST. PATIENT-LVL III: CPT | Mod: PBBFAC,,, | Performed by: INTERNAL MEDICINE

## 2024-10-24 PROCEDURE — 3078F DIAST BP <80 MM HG: CPT | Mod: CPTII,S$GLB,, | Performed by: INTERNAL MEDICINE

## 2024-10-24 PROCEDURE — 3074F SYST BP LT 130 MM HG: CPT | Mod: CPTII,S$GLB,, | Performed by: INTERNAL MEDICINE

## 2024-10-24 RX ORDER — DEXTROAMPHETAMINE SACCHARATE, AMPHETAMINE ASPARTATE, DEXTROAMPHETAMINE SULFATE AND AMPHETAMINE SULFATE 2.5; 2.5; 2.5; 2.5 MG/1; MG/1; MG/1; MG/1
10 TABLET ORAL 2 TIMES DAILY
Qty: 60 TABLET | Refills: 0 | Status: SHIPPED | OUTPATIENT
Start: 2024-10-24

## 2024-10-24 RX ORDER — NORGESTIMATE AND ETHINYL ESTRADIOL 0.25-0.035
1 KIT ORAL DAILY
COMMUNITY

## 2024-10-24 NOTE — PROGRESS NOTES
[facility] Note    Chief Complaint      Chief Complaint   Patient presents with    Follow-up     History of Present Illness      Renetta Deshpande is a 34 y.o. female who presents today for chief complaint follow up ADHD.     HPI   Annual labs utd, had annual 2.24 Dr Avalos     ADHD new dx/re dx, she was dx at age 3. Was re eval in August 2024 psych [facility] here at [facility], would like more focus and arnie distraction, shed like to go back to school. She feels she was previously able to compensate in her day to day life but concerned about going back to school    reviewed     Pap: Kelli Ramírez  Tobacco: never  Other MDs:   I personally reviewed all past medical, surgical, social and family history.  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 Mood is ok, having some new onset sleep difficulty, exercise as above, watches diet      Active Problem List with Overview Notes    Diagnosis Date Noted    Syncope 07/11/2022    Dyspnea on exertion 04/05/2022    Palpitations 04/05/2022    Tachycardia 04/05/2022       Health Maintenance   Topic Date Due    TETANUS VACCINE  05/01/2024    Hepatitis C Screening  Completed    Lipid Panel  Completed       Past Medical History:   Diagnosis Date    Anxiety     Depression        Past Surgical History:   Procedure Laterality Date    ADENOIDECTOMY  1989    TONSILLECTOMY         family history includes Breast cancer in her maternal grandmother; Cancer in her paternal grandmother; Diabetes in her paternal grandmother; Heart disease in her maternal grandmother; Heart failure in her maternal grandfather, maternal grandmother, and paternal grandfather; Hypertension in her maternal grandmother; Kidney disease in her maternal grandmother; Migraines in her mother; No Known Problems in her father; Osteoarthritis in her maternal grandfather, maternal grandmother, and paternal grandmother.    Social History     Tobacco  "Use    Smoking status: Never    Smokeless tobacco: Never   Substance Use Topics    Alcohol use: Yes     Alcohol/week: 2.0 standard drinks of alcohol     Types: 2 Drinks containing 0.5 oz of alcohol per week    Drug use: Never       Review of Systems   Constitutional:  Negative for chills, fever, malaise/fatigue and weight loss.   HENT:  Negative for hearing loss.    Eyes:  Negative for discharge.   Respiratory:  Negative for cough, sputum production, shortness of breath and wheezing.    Cardiovascular:  Negative for chest pain, palpitations, orthopnea and leg swelling.   Gastrointestinal:  Negative for blood in stool, constipation, diarrhea, nausea and vomiting.   Genitourinary:  Negative for dysuria, frequency, hematuria and urgency.   Musculoskeletal:  Negative for neck pain.   Neurological:  Negative for weakness and headaches.   Endo/Heme/Allergies:  Negative for polydipsia.        Outpatient Encounter Medications as of 10/24/2024   Medication Sig Dispense Refill    albuterol (VENTOLIN HFA) 90 mcg/actuation inhaler Inhale 2 puffs into the lungs every 6 (six) hours as needed for Wheezing. Rescue 18 g 0    ESTARYLLA 0.25-35 mg-mcg per tablet Take 1 tablet by mouth once daily.      dextroamphetamine-amphetamine (ADDERALL) 10 mg Tab Take 1 tablet (10 mg total) by mouth 2 (two) times daily. 60 tablet 0    [DISCONTINUED] BLISOVI FE 1/20, 28, 1 mg-20 mcg (21)/75 mg (7) per tablet Take 1 tablet by mouth once daily.       No facility-administered encounter medications on file as of 10/24/2024.        Review of patient's allergies indicates:  No Known Allergies        Physical Exam      Vital Signs  Pulse: 70  SpO2: 98 %  BP: 124/68  BP Location: Right arm  Patient Position: Sitting  Height and Weight  Height: 5' 5" (165.1 cm)  Weight: 77.6 kg (171 lb 1.2 oz)  BSA (Calculated - sq m): 1.89 sq meters  BMI (Calculated): 28.5  Weight in (lb) to have BMI = 25: 149.9]    Physical Exam  Constitutional:       General: She is not " "in acute distress.     Appearance: She is well-developed. She is not diaphoretic.   HENT:      Head: Normocephalic and atraumatic.      Right Ear: External ear normal.      Left Ear: External ear normal.      Nose: Nose normal.   Eyes:      General:         Right eye: No discharge.         Left eye: No discharge.      Conjunctiva/sclera: Conjunctivae normal.   Pulmonary:      Effort: Pulmonary effort is normal. No respiratory distress.   Musculoskeletal:         General: Normal range of motion.      Cervical back: Normal range of motion.   Skin:     Coloration: Skin is not pale.      Findings: No rash.   Neurological:      Mental Status: She is alert and oriented to person, place, and time.   Psychiatric:         Behavior: Behavior normal.         Thought Content: Thought content normal.          Laboratory:  CBC:  No results for input(s): "WBC", "RBC", "HGB", "HCT", "PLT", "MCV", "MCH", "MCHC" in the last 2160 hours.  CMP:  No results for input(s): "GLU", "CALCIUM", "ALBUMIN", "PROT", "NA", "K", "CO2", "CL", "BUN", "ALKPHOS", "ALT", "AST", "BILITOT" in the last 2160 hours.    Invalid input(s): "CREATININ"  URINALYSIS:  No results for input(s): "COLORU", "CLARITYU", "SPECGRAV", "PHUR", "PROTEINUA", "GLUCOSEU", "BILIRUBINCON", "BLOODU", "WBCU", "RBCU", "BACTERIA", "MUCUS", "NITRITE", "LEUKOCYTESUR", "UROBILINOGEN", "HYALINECASTS" in the last 2160 hours.   LIPIDS:  No results for input(s): "TSH", "HDL", "CHOL", "TRIG", "LDLCALC", "CHOLHDL", "NONHDLCHOL", "TOTALCHOLEST" in the last 2160 hours.  TSH:  No results for input(s): "TSH" in the last 2160 hours.  A1C:  No results for input(s): "HGBA1C" in the last 2160 hours.    Radiology:      Assessment/Plan     Renetta Deshpande is a 34 y.o.female with:    1. Attention deficit hyperactivity disorder (ADHD), unspecified ADHD type  -     dextroamphetamine-amphetamine (ADDERALL) 10 mg Tab; Take 1 tablet (10 mg total) by mouth 2 (two) times daily.  Dispense: 60 tablet; " Refill: 0         Use of the [facility] Patient Portal discussed and encouraged during today's visit  -Continue current medications and maintain follow up with specialists.  Return to clinic in .  Future Appointments   Date Time Provider Department Center   10/25/2024  1:00 PM [facility] [place] [facility] [place]   11/21/2024  7:40 AM Samantha Dow MD [facility]       Samantha Dow MD  10/24/2024 1:25 PM    Primary Care Internal Medicine

## 2024-11-20 ENCOUNTER — PATIENT MESSAGE (OUTPATIENT)
Dept: PRIMARY CARE CLINIC | Facility: CLINIC | Age: 34
End: 2024-11-20
Payer: COMMERCIAL

## 2024-11-20 NOTE — TELEPHONE ENCOUNTER
Pt reports headache/migraine since starting the Adderall medication prescribed on 10/24    Would like to know next steps

## 2025-02-28 ENCOUNTER — OFFICE VISIT (OUTPATIENT)
Dept: INTERNAL MEDICINE | Facility: CLINIC | Age: 35
End: 2025-02-28
Payer: COMMERCIAL

## 2025-02-28 VITALS
WEIGHT: 168 LBS | SYSTOLIC BLOOD PRESSURE: 118 MMHG | HEIGHT: 65 IN | HEART RATE: 66 BPM | BODY MASS INDEX: 27.99 KG/M2 | DIASTOLIC BLOOD PRESSURE: 68 MMHG

## 2025-02-28 DIAGNOSIS — F32.A ANXIETY AND DEPRESSION: ICD-10-CM

## 2025-02-28 DIAGNOSIS — Z23 NEED FOR TETANUS BOOSTER: ICD-10-CM

## 2025-02-28 DIAGNOSIS — R05.1 ACUTE COUGH: Primary | ICD-10-CM

## 2025-02-28 DIAGNOSIS — F41.9 ANXIETY AND DEPRESSION: ICD-10-CM

## 2025-02-28 LAB
CTP QC/QA: YES
CTP QC/QA: YES
POC MOLECULAR INFLUENZA A AGN: NEGATIVE
POC MOLECULAR INFLUENZA B AGN: NEGATIVE
SARS-COV-2 RDRP RESP QL NAA+PROBE: NEGATIVE

## 2025-02-28 PROCEDURE — 99999 PR PBB SHADOW E&M-EST. PATIENT-LVL III: CPT | Mod: PBBFAC,,,

## 2025-02-28 NOTE — PATIENT INSTRUCTIONS
Please drink plenty of fluids.  Please get plenty of rest.  Please return here or go to the Emergency Department for any concerns or worsening of condition.  It is ok to take over the counter plain Allegra or Claritin or Zyrtec.   We recommend you take Flonase (Fluticasone) or another nasally inhaled steroid unless you are already taking one.  Nasal irrigation with a saline spray or Netti Pot like device per their directions is also recommended.  If not allergic, please take over the counter Tylenol (Acetaminophen) and/or Motrin (Ibuprofen) as directed for control of pain and/or fever.  Please follow up with your primary care doctor or specialist as needed

## 2025-02-28 NOTE — PROGRESS NOTES
INTERNAL MEDICINE URGENT CARE NOTE    CHIEF COMPLAINT     Renetta presents today for evaluation of throat irritation and cough after flu exposure from parents.    HPI     Renetta Deshpande is a 34 y.o. female who presents for an urgent care visit today.    CURRENT SYMPTOMS:  She reports scratchy throat which started yesterday morning with a dry, non-productive cough occurring every 2-3 hours without wheezing. She denies fever, fatigue, muscle aches, ear pain, sinus pain, pressure headache, chest pain, palpitations, shortness of breath, nausea, vomiting, diarrhea, and weakness.    EXPOSURE HISTORY:  She reports exposure to influenza through her parents who currently have the flu. Close contact occurred when her mother picked up her daughter last Thursday. Parents tested positive Friday. Her daughter developed a cough on Saturday night, though she notes this could be due to environmental factors such as dust from parade floats or night air rather than flu.    IMMUNIZATIONS:  Her last tetanus vaccine was in 2014.    ANXIETY AND DEPRESSION  She reports she has taken Lexapro in the past. Presently only managed with outside therapy that she sees regularly.    Review of Systems:  General: -fever, -chills, -fatigue, -weight gain, -weight loss  Eyes: -vision changes, -redness, -discharge  ENT: -ear pain, -nasal congestion, +sore throat  Cardiovascular: -chest pain, -palpitations, -lower extremity edema  Respiratory: +cough, -shortness of breath  Gastrointestinal: -abdominal pain, -nausea, -vomiting, -diarrhea, -constipation, -blood in stool  Genitourinary: -dysuria, -hematuria, -frequency  Musculoskeletal: -joint pain, -muscle pain  Skin: -rash, -lesion  Neurological: -headache, -dizziness, -numbness, -tingling  Psychiatric: -anxiety, -depression, -sleep difficulty        Past Medical History:  Past Medical History:   Diagnosis Date    Anxiety     Depression      Home Medications:  Prior to Admission medications   "  Medication Sig Start Date End Date Taking? Authorizing Provider   albuterol (VENTOLIN HFA) 90 mcg/actuation inhaler Inhale 2 puffs into the lungs every 6 (six) hours as needed for Wheezing. Rescue 11/17/23 2/28/25 Yes Narciso Christiansen,    ESTARYLLA 0.25-35 mg-mcg per tablet Take 1 tablet by mouth once daily.   Yes Provider, Historical   dextroamphetamine-amphetamine (ADDERALL) 10 mg Tab Take 1 tablet (10 mg total) by mouth 2 (two) times daily. 10/24/24   Samantha Dow MD     PHYSICAL EXAM     General: No acute distress. Well-developed. Well-nourished.  Eyes: EOMI. Sclerae anicteric.  HENT: Normocephalic. Atraumatic. Nares patent. Moist oral mucosa. Mild pharyngeal erythema.  Ears: Bilateral TMs clear. Bilateral EACs clear.  Cardiovascular: Regular rate. Regular rhythm. No murmurs. No rubs. No gallops. Normal S1, S2.  Respiratory: Normal respiratory effort. Clear to auscultation bilaterally. No rales. No rhonchi. No wheezing.  Abdomen: Soft. Non-tender. Non-distended. Normoactive bowel sounds.  Musculoskeletal: No  obvious deformity.  Extremities: No lower extremity edema.  Neurological: Alert & oriented x3. No slurred speech. Normal gait.  Psychiatric: Normal mood. Normal affect. Good insight. Good judgment.  Skin: Warm. Dry. No rash.        /68 (BP Location: Left arm, Patient Position: Sitting)   Pulse 66   Ht 5' 5" (1.651 m)   Wt 76.2 kg (167 lb 15.9 oz)   BMI 27.96 kg/m²     ASSESSMENT/PLAN     1. Acute cough  -     POCT COVID-19 Rapid Screening  -     POCT Influenza A/B Molecular    2. Need for tetanus booster  -     Tdap (BOOSTRIX) vaccine injection 0.5 mL    IMPRESSION:  - Assessed patient for potential influenza infection based on recent exposure and mild symptoms  - Conducted physical exam, finding only mildly red throat  - Considered patient's upcoming plans to attend parades when deciding on treatment and vaccination timing    POSSIBLE URI  - POCT Influenza testing completed- NEGATIVE  - " POCT COVID-19 rapid screening completed- NEGATIVE  - Instructed the patient to monitor for worsening symptoms over the next few days.  - Acknowledged the possibility of influenza infection based on symptoms and recent exposure, but noted the absence of other typical flu symptoms.  - Recommend follow-up if symptoms worsen in the next few days.  - Renetta reports recent close contact with parents who had influenza.  - Renetta's daughter was in contact with mother last Thursday.  - Acknowledged the possibility of influenza transmission from family members to the patient and her daughter.    TETANUS VACCINATION:  - Renetta is past due for tetanus vaccination, with the last recorded tetanus vaccine in 2014.  -     Tdap (BOOSTRIX) vaccine injection 0.5 mL       ANXIETY AND DEPRESSION  - Continue regular therapy sessions (outside provider)      Patient education provided from [facility]. Patient was counseled on when and how to seek emergent care.   This note was generated with the assistance of ambient listening technology. Verbal consent was obtained by the patient and accompanying visitor(s) for the recording of patient appointment to facilitate this note. I attest to having reviewed and edited the generated note for accuracy, though some syntax or spelling errors may persist. Please contact the author of this note for any clarification.       Asuncion LU, APRN, FNP-c   Department of Internal Medicine - [facility]  8:05 AM

## 2025-03-03 ENCOUNTER — RESULTS FOLLOW-UP (OUTPATIENT)
Dept: INTERNAL MEDICINE | Facility: CLINIC | Age: 35
End: 2025-03-03